# Patient Record
Sex: FEMALE | Race: WHITE | Employment: FULL TIME | URBAN - METROPOLITAN AREA
[De-identification: names, ages, dates, MRNs, and addresses within clinical notes are randomized per-mention and may not be internally consistent; named-entity substitution may affect disease eponyms.]

---

## 2017-01-18 DIAGNOSIS — Z83.49 FAMILY HISTORY OF THYROID DISEASE: ICD-10-CM

## 2017-01-18 DIAGNOSIS — Z13.6 CARDIOVASCULAR SCREENING; LDL GOAL LESS THAN 160: ICD-10-CM

## 2017-01-18 LAB
CHOLEST SERPL-MCNC: 162 MG/DL
HDLC SERPL-MCNC: 67 MG/DL
LDLC SERPL CALC-MCNC: 87 MG/DL
NONHDLC SERPL-MCNC: 95 MG/DL
TRIGL SERPL-MCNC: 41 MG/DL
TSH SERPL DL<=0.005 MIU/L-ACNC: 1.32 MU/L (ref 0.4–4)

## 2017-01-18 PROCEDURE — 36415 COLL VENOUS BLD VENIPUNCTURE: CPT | Performed by: NURSE PRACTITIONER

## 2017-01-18 PROCEDURE — 84443 ASSAY THYROID STIM HORMONE: CPT | Performed by: NURSE PRACTITIONER

## 2017-01-18 PROCEDURE — 80061 LIPID PANEL: CPT | Performed by: NURSE PRACTITIONER

## 2019-07-29 ENCOUNTER — OFFICE VISIT (OUTPATIENT)
Dept: FAMILY MEDICINE | Facility: CLINIC | Age: 44
End: 2019-07-29
Payer: COMMERCIAL

## 2019-07-29 VITALS
TEMPERATURE: 98.3 F | WEIGHT: 158 LBS | OXYGEN SATURATION: 100 % | DIASTOLIC BLOOD PRESSURE: 76 MMHG | HEIGHT: 67 IN | SYSTOLIC BLOOD PRESSURE: 125 MMHG | BODY MASS INDEX: 24.8 KG/M2 | HEART RATE: 91 BPM

## 2019-07-29 DIAGNOSIS — Z00.00 ROUTINE GENERAL MEDICAL EXAMINATION AT A HEALTH CARE FACILITY: Primary | ICD-10-CM

## 2019-07-29 DIAGNOSIS — R10.2 PELVIC PRESSURE IN FEMALE: ICD-10-CM

## 2019-07-29 DIAGNOSIS — Z12.31 VISIT FOR SCREENING MAMMOGRAM: ICD-10-CM

## 2019-07-29 DIAGNOSIS — Z12.4 SCREENING FOR MALIGNANT NEOPLASM OF CERVIX: ICD-10-CM

## 2019-07-29 PROCEDURE — 87624 HPV HI-RISK TYP POOLED RSLT: CPT | Performed by: PHYSICIAN ASSISTANT

## 2019-07-29 PROCEDURE — 99212 OFFICE O/P EST SF 10 MIN: CPT | Mod: 25 | Performed by: PHYSICIAN ASSISTANT

## 2019-07-29 PROCEDURE — 99396 PREV VISIT EST AGE 40-64: CPT | Performed by: PHYSICIAN ASSISTANT

## 2019-07-29 PROCEDURE — G0145 SCR C/V CYTO,THINLAYER,RESCR: HCPCS | Performed by: PHYSICIAN ASSISTANT

## 2019-07-29 ASSESSMENT — PAIN SCALES - GENERAL: PAINLEVEL: NO PAIN (0)

## 2019-07-29 ASSESSMENT — MIFFLIN-ST. JEOR: SCORE: 1396.69

## 2019-07-29 NOTE — PATIENT INSTRUCTIONS
Schedule mammogram    Contact [facility] [phone] to schedule appointment for pelvic ultrasound    Appointment in GYN to discuss pelvic relaxation treatment options        Preventive Health Recommendations  Female Ages 40 to 49    Yearly exam:     See your health care provider every year in order to  1. Review health changes.   2. Discuss preventive care.    3. Review your medicines if your doctor prescribed any.      Get a Pap test every three years (unless you have an abnormal result and your provider advises testing more often).      If you get Pap tests with HPV test, you only need to test every 5 years, unless you have an abnormal result. You do not need a Pap test if your uterus was removed (hysterectomy) and you have not had cancer.      You should be tested each year for STDs (sexually transmitted diseases), if you're at risk.     Ask your doctor if you should have a mammogram.      Have a colonoscopy (test for colon cancer) if someone in your family has had colon cancer or polyps before age 50.       Have a cholesterol test every 5 years.       Have a diabetes test (fasting glucose) after age 45. If you are at risk for diabetes, you should have this test every 3 years.    Shots: Get a flu shot each year. Get a tetanus shot every 10 years.     Nutrition:     Eat at least 5 servings of fruits and vegetables each day.    Eat whole-grain bread, whole-wheat pasta and brown rice instead of white grains and rice.    Get adequate Calcium and Vitamin D.      Lifestyle    Exercise at least 150 minutes a week (an average of 30 minutes a day, 5 days a week). This will help you control your weight and prevent disease.    Limit alcohol to one drink per day.    No smoking.     Wear sunscreen to prevent skin cancer.    See your dentist every six months for an exam and cleaning.

## 2019-07-29 NOTE — NURSING NOTE
"Chief Complaint   Patient presents with     Physical       Initial /76   Pulse 91   Temp 98.3  F (36.8  C) (Oral)   Ht 1.69 m (5' 6.52\")   Wt 71.7 kg (158 lb)   LMP 07/19/2019   SpO2 100%   BMI 25.10 kg/m   Estimated body mass index is 25.1 kg/m  as calculated from the following:    Height as of this encounter: 1.69 m (5' 6.52\").    Weight as of this encounter: 71.7 kg (158 lb).  Medication Reconciliation: complete    TALIB Toney MA    "

## 2019-07-29 NOTE — PROGRESS NOTES
SUBJECTIVE:   CC: Brittni Massey is an 44 year old woman who presents for preventive health visit.     Healthy Habits:    Do you get at least three servings of calcium containing foods daily (dairy, green leafy vegetables, etc.)? yes    Amount of exercise or daily activities, outside of work: 4 day(s) per week    Problems taking medications regularly not applicable    Medication side effects:     Have you had an eye exam in the past two years? no    Do you see a dentist twice per year? yes    Do you have sleep apnea, excessive snoring or daytime drowsiness?          Today's PHQ-2 Score:   PHQ-2 ( 1999 Pfizer) 7/29/2019 3/21/2016   Q1: Little interest or pleasure in doing things 0 0   Q2: Feeling down, depressed or hopeless 0 0   PHQ-2 Score 0 0       Abuse: Current or Past(Physical, Sexual or Emotional)- No  Do you feel safe in your environment? Yes    Social History     Tobacco Use     Smoking status: Never Smoker     Smokeless tobacco: Never Used     Tobacco comment: Nonsmoking household   Substance Use Topics     Alcohol use: Yes     Comment: Very very rarely     If you drink alcohol do you typically have >3 drinks per day or >7 drinks per week? No                     Reviewed orders with patient.  Reviewed health maintenance and updated orders accordingly - Yes  BP Readings from Last 3 Encounters:   07/29/19 125/76   03/21/16 115/72   03/16/15 121/81    Wt Readings from Last 3 Encounters:   07/29/19 71.7 kg (158 lb)   03/21/16 64 kg (141 lb)   03/16/15 63 kg (139 lb)                  Patient Active Problem List   Diagnosis     CARDIOVASCULAR SCREENING; LDL GOAL LESS THAN 160     Dysmenorrhea     Dysplasia of cervix, low grade (NO 1)     Past Surgical History:   Procedure Laterality Date     SURGICAL HISTORY OF -   1990    Shaved heel bone right foot.       Social History     Tobacco Use     Smoking status: Never Smoker     Smokeless tobacco: Never Used     Tobacco comment: Nonsmoking household   Substance  Use Topics     Alcohol use: Yes     Comment: Very very rarely     Family History   Problem Relation Age of Onset     Heart Disease Mother         MI before age 40. congenital defect.      Hypertension Father      Circulatory Maternal Grandfather         brain aneurysm     Cancer Paternal Grandfather         throat and mouth, smoker         No current outpatient medications on file.     Allergies   Allergen Reactions     Sulfa Drugs Rash       Mammogram Screening: Patient under age 50, mutual decision reflected in health maintenance.      Pertinent mammograms are reviewed under the imaging tab.  History of abnormal Pap smear:   YES - updated in Problem List and Health Maintenance accordingly  Last 3 Pap and HPV Results:   PAP / HPV Latest Ref Rng & Units 3/21/2016 2/24/2015 4/17/2012   PAP - NIL LSIL(A) NIL   HPV 16 DNA NEG Negative - -   HPV 18 DNA NEG Negative - -   OTHER HR HPV NEG Negative - -     PAP / HPV Latest Ref Rng & Units 3/21/2016 2/24/2015 4/17/2012   PAP - NIL LSIL(A) NIL   HPV 16 DNA NEG Negative - -   HPV 18 DNA NEG Negative - -   OTHER HR HPV NEG Negative - -     Reviewed and updated as needed this visit by clinical staff  Tobacco  Allergies  Meds  Med Hx  Surg Hx  Fam Hx  Soc Hx        Reviewed and updated as needed this visit by Provider        Past Medical History:   Diagnosis Date     H/O colposcopy with cervical biopsy 3/16/15    ECC - LSIL, Bx - NO 1     LSIL (low grade squamous intraepithelial lesion) on Pap smear 2/24/15    + HR HPV      Past Surgical History:   Procedure Laterality Date     SURGICAL HISTORY OF -   1990    Shaved heel bone right foot.       ROS:  CONSTITUTIONAL: NEGATIVE for fever, chills, change in weight  INTEGUMENTARU/SKIN: NEGATIVE for worrisome rashes, moles or lesions  EYES: NEGATIVE for vision changes or irritation  ENT: NEGATIVE for ear, mouth and throat problems  RESP: NEGATIVE for significant cough or SOB  BREAST: NEGATIVE for masses, tenderness or  "discharge  CV: NEGATIVE for chest pain, palpitations or peripheral edema  GI: NEGATIVE for nausea, abdominal pain, heartburn, or change in bowel habits   female: normal menses. She feels a constant pelvic pressure,some urinary incontinence with stress.   MUSCULOSKELETAL: NEGATIVE for significant arthralgias or myalgia  NEURO: NEGATIVE for weakness, dizziness or paresthesias  PSYCHIATRIC: NEGATIVE for changes in mood or affect    OBJECTIVE:   /76   Pulse 91   Temp 98.3  F (36.8  C) (Oral)   Ht 1.69 m (5' 6.52\")   Wt 71.7 kg (158 lb)   LMP 07/19/2019   SpO2 100%   BMI 25.10 kg/m    EXAM:  GENERAL: healthy, alert and no distress  EYES: Eyes grossly normal to inspection, PERRL and conjunctivae and sclerae normal  HENT: ear canals and TM's normal, nose and mouth without ulcers or lesions  NECK: no adenopathy, no asymmetry, masses, or scars and thyroid normal to palpation  RESP: lungs clear to auscultation - no rales, rhonchi or wheezes  BREAST: deferred per patient  CV: regular rate and rhythm, normal S1 S2, no S3 or S4, no murmur, click or rub, no peripheral edema and peripheral pulses strong  ABDOMEN: soft, nontender, no hepatosplenomegaly, no masses and bowel sounds normal   (female): normal female external genitalia, normal urethral meatus, vaginal mucosa pink, moist, well rugated, and normal cervix/adnexa/uterus without masses or discharge  MS: no gross musculoskeletal defects noted, no edema  SKIN: no suspicious lesions or rashes  NEURO: Normal strength and tone, mentation intact and speech normal  PSYCH: mentation appears normal, affect normal/bright    Diagnostic Test Results:  none     ASSESSMENT/PLAN:   1. Routine general medical examination at a health care facility  Health maintenance reviewed and updated.    2. Screening for malignant neoplasm of cervix  - Pap imaged thin layer screen with HPV - recommended age 30 - 65  - HPV High Risk Types DNA Cervical    3. Visit for screening " "mammogram  - MA SCREENING DIGITAL BILAT - Future  (s+30); Future    4. Pelvic pressure in female  Plan for pelvic ultrasound. Reassurance that her exam is normal.   Referral placed for GYN consultation.   - US Pelvic Complete w Transvaginal; Future  - OB/GYN REFERRAL    COUNSELING:   Reviewed preventive health counseling, as reflected in patient instructions       Regular exercise       Healthy diet/nutrition       Contraception       Colon cancer screening       (Liya)menopause management    Estimated body mass index is 25.1 kg/m  as calculated from the following:    Height as of this encounter: 1.69 m (5' 6.52\").    Weight as of this encounter: 71.7 kg (158 lb).    Weight management plan: Discussed healthy diet and exercise guidelines     reports that she has never smoked. She has never used smokeless tobacco.      Counseling Resources:  ATP IV Guidelines  Pooled Cohorts Equation Calculator  Breast Cancer Risk Calculator  FRAX Risk Assessment  ICSI Preventive Guidelines  Dietary Guidelines for Americans, 2010  USDA's MyPlate  ASA Prophylaxis  Lung CA Screening    Kristen M. Kehr, PA-C  [facility]  "

## 2019-08-01 LAB
COPATH REPORT: NORMAL
PAP: NORMAL

## 2019-08-05 LAB
FINAL DIAGNOSIS: NORMAL
HPV HR 12 DNA CVX QL NAA+PROBE: NEGATIVE
HPV16 DNA SPEC QL NAA+PROBE: NEGATIVE
HPV18 DNA SPEC QL NAA+PROBE: NEGATIVE
SPECIMEN DESCRIPTION: NORMAL
SPECIMEN SOURCE CVX/VAG CYTO: NORMAL

## 2020-02-23 ENCOUNTER — HEALTH MAINTENANCE LETTER (OUTPATIENT)
Age: 45
End: 2020-02-23

## 2020-05-16 ENCOUNTER — VIRTUAL VISIT (OUTPATIENT)
Dept: FAMILY MEDICINE | Facility: OTHER | Age: 45
End: 2020-05-16

## 2020-05-16 NOTE — PROGRESS NOTES
"Date: 2020 10:56:39  Clinician: Edward Schultz  Clinician NPI: [id number]  Patient: Brittni Massey  Patient : 1975  Patient Address: [address] [place], [place] [id number]  Patient Phone: [phone]  Visit Protocol: URI  Patient Summary:  Brittni is a 45 year old ( : 1975 ) female who initiated a Visit for COVID-19 (Coronavirus) evaluation and screening. When asked the question \"Please sign me up to receive news, health information and promotions. \", Brittni responded \"No\".    Brittni states her symptoms started 1-2 days ago.   Her symptoms consist of facial pain or pressure, a cough, nasal congestion, rhinitis, a headache, malaise, and myalgia.   Symptom details     Nasal secretions: The color of her mucus is clear.    Cough: Brittni coughs a few times an hour and her cough is not more bothersome at night. Phlegm does not come into her throat when she coughs. She does not believe her cough is caused by post-nasal drip.     Facial pain or pressure: The facial pain or pressure does not feel worse when bending or leaning forward.     Headache: She states the headache is mild (1-3 on a 10 point pain scale).      Brittni denies having nausea, teeth pain, ageusia, diarrhea, chills, sore throat, wheezing, enlarged lymph nodes, fever, vomiting, ear pain, and anosmia. She also denies having recent facial or sinus surgery in the past 60 days and taking antibiotic medication for the symptoms. She is not experiencing dyspnea.   Precipitating events  She has not recently been exposed to someone with influenza. Brittni has been in close contact with the following high risk individuals: people with asthma, heart disease or diabetes and adults 65 or older.   Pertinent COVID-19 (Coronavirus) information  In the past 14 days, Brittni has worked in a congregate living setting.   She either works or volunteers as a healthcare worker or a , or works or volunteers in a healthcare facility. She " provides direct patient care. 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 She does not live with a healthcare worker.   Brittni has had a close contact with a laboratory-confirmed COVID-19 patient within 14 days of symptom onset. She was exposed at her work. Additional information about contact with COVID-19 (Coronavirus) patient as reported by the patient (free text): Direct contact with coworker and patient   Pertinent medical history  Brittni does not get yeast infections when she takes antibiotics.   Brittni needs a return to work/school note.   Weight: 155 lbs   Brittni does not smoke or use smokeless tobacco.   She denies pregnancy and denies breastfeeding. She has menstruated in the past month.   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 This illness can cause fever, cough and trouble breathing. Many people get a mild case and get better on their own. Some people can get very sick.  What should I do?  We would like to test you for this virus. This will be a curbside test done outside the clinic.  Please call [phone] to schedule your visit. Explain that you were referred by [facility] to have a COVID-19 test. Be ready to share your [facility] visit ID number.  Starting now:  Stay at least 6 feet away from others. (If someone will drive you to your test, stay in the backseat, as far away from the  as you can.)   Don't go to work, school or anywhere else. When it's time for your test, go straight to the testing site. Don't make any stops on the way there or back.   Wash your hands  "and face often. Use soap and water.   Cover your mouth and nose with a mask, tissue or washcloth.   Don't touch anyone. No hugging, kissing or handshakes.  While at home   Stay home and away from others (self-isolate) until:  You've had no fever---and no medicine that reduces fever---for 3 full days (72 hours). And...  Your other symptoms have gotten better. For example, your cough or breathing has improved. And...  At least 10 days have passed since your symptoms started.  During this time:  Stay in your own room (and use your own bathroom), if you can.  Don't go to work, school or anywhere else.  Stay away from others in your home. No hugging, kissing or shaking hands.  Don't let anyone visit.  Cover your mouth and nose with a mask, tissue or washcloth to avoid spreading germs.  Clean \"high touch\" surfaces often (doorknobs, counters, handles, etc.). Use a household cleaning spray or wipes.  Wash your hands and face often. Use soap and water.  How can I take care of myself?  1. Get lots of rest. Drink extra fluids (unless your doctor has told you not to).  2. Take Tylenol (acetaminophen) for fever or pain. If you have liver or kidney problems, ask your family doctor if it's okay to take Tylenol.  Adults can take either:   650 mg (two 325 mg pills) every 4 to 6 hours, or...  1,000 mg (two 500 mg pills) every 8 hours as needed.   Note: Don't take more than 3,000 mg in one day.   Acetaminophen is found in many medicines (both prescribed and over-the-counter medicines). Read all labels to be sure you don't take too much.   For children, check the Tylenol bottle for the right dose. The dose is based on the child's age or weight.  3. If you have other health problems (like cancer, heart failure, an organ transplant or severe kidney disease): Call your specialty clinic if you don't feel better in the next 2 days.  4. Know when to call 911: If your breathing is so bad that it keeps you from doing normal activities, call " 911 or go to the emergency room. Tell them that you've been staying home and may have COVID-19.  5. Sign up for [employer]. We know it's scary to hear that you might have COVID-19. We want to track your symptoms to make sure you're okay over the next 2 weeks. Please look for an email from [employer]---this is a free, online program that we'll use to keep in touch. To sign up, follow the link in the email. Learn more at [email or web address].  6. The following will serve as your written order for this Covid Test ordered by me for the indication of suspected Covid [Z20.828]: The test will be ordered in [employer], our electronic health record after you are scheduled and will show as ordered and authorized by Nicholas Soria MD   Order: Covid-19 (Coronavirus) PCR for SYMPTOMATIC testing from [facility]  Where can I get more information?  To learn more about COVID-19 and how to care for yourself at home, please visit the CDC website at https://www.cdc.gov/coronavirus/2019-ncov/about/steps-when-sick.html.  For more about your care at [facility], please visit [email or web address]  If you'd like to be part of a COVID-19 clinical trial (research study) at the [facility], go to [email or web address] for details.    Diagnosis: Cough  Diagnosis ICD: R05

## 2020-05-17 ENCOUNTER — OFFICE VISIT (OUTPATIENT)
Dept: URGENT CARE | Facility: URGENT CARE | Age: 45
End: 2020-05-17
Payer: COMMERCIAL

## 2020-05-17 DIAGNOSIS — Z20.822 SUSPECTED COVID-19 VIRUS INFECTION: Primary | ICD-10-CM

## 2020-05-17 PROCEDURE — 87635 SARS-COV-2 COVID-19 AMP PRB: CPT | Mod: 90 | Performed by: FAMILY MEDICINE

## 2020-05-17 PROCEDURE — 99207 ZZC NO BILLABLE SERVICE THIS VISIT: CPT

## 2020-05-17 PROCEDURE — 99000 SPECIMEN HANDLING OFFICE-LAB: CPT | Performed by: FAMILY MEDICINE

## 2020-05-18 ENCOUNTER — TELEPHONE (OUTPATIENT)
Dept: FAMILY MEDICINE | Facility: CLINIC | Age: 45
End: 2020-05-18

## 2020-05-18 LAB
SARS-COV-2 RNA SPEC QL NAA+PROBE: ABNORMAL
SPECIMEN SOURCE: ABNORMAL

## 2020-05-18 NOTE — TELEPHONE ENCOUNTER
"Coronavirus (COVID-19) Notification    Patient  Brittni Massey    Reason for call  Notify of Positive Coronavirus (COVID-19) lab results, assess symptoms,  review [facility] recommendations    Lab Result    Lab test:  2019-nCoV rRt-PCR or SARS-CoV-2 PCR    Oropharyngeal AND/OR nasopharyngeal swabs is POSITIVE for 2019-nCoV RNA/SARS-COV-2 PCR (COVID-19 virus)    RN Recommendations/Instructions per [facility] Coronavirus COVID-19 recommendations    Brief introduction script  Hi, My name is NOEMI Sharma and I am calling on behalf of [employer].  We were notified that your Coronavirus test (COVID-19) for was POSITIVE for the virus.  I have some information to relay to you but first I wanted to mention that the [place] Dept of Health will be contacting you shortly [it's possible MD already called Patient] to talk to you more about how you are feeling and other people you have had contact with who might now also have the virus.  Also, [facility] is Partnering with the [facility] for Covid-19 research, you may be contacted directly by research staff.    Assessment (Inquire about Patient's current symptoms)  Pt reports cough and nasal congestion, onset of symptom 5/14/20.  Pt reports \"stuffy nose\" and cough  as predominant symptoms although she does feel run down and \"head-achy\".  Pt reports feeling the same today as when symptoms began.  Denies Fever, and shortness of breath.     If at time of call, Patients symptoms hare worsened, the Patient should contact 911 or have someone drive them to Emergency Dept promptly:      If Patient calling 911, inform 911 personal that you have tested positive for the Coronavirus (COVID-19).  Place mask on and await 911 to arrive.    If Emergency Dept, If possible, please have another adult drive you to the Emergency Dept but you need to wear mask when in contact with other people.      Review information with Patient    Since you tested POSITIVE for the " COVID-19 virus, it is important that you protect others from being exposed and infected with this virus.    [For safety, it's very important to follow these rules.]    First, stay home and away from others (self-isolate) until:    You've had no fever--and no medicine that reduces fever--for 3 full days (72 hours). And      Your other symptoms have gotten better. For example, your cough or breathing has improved. And     At least 10 days have passed since your symptoms started.    During this time:    Stay in your own room (and use your own bathroom), if you can.    Stay away from others in your home. No hugging, kissing or shaking hands.    Don't let anyone visit.    Don't go to work, school or anywhere else.     Clean  high touch  surfaces often (doorknobs, counters, handles, etc.). Use a household cleaning spray or wipes.    Cover your mouth and nose with a mask, tissue or washcloth to avoid spreading germs.    Wash your hands and face often with soap and water.    You should not go back to work until you meet the guidelines above for ending your home isolation. You should meet these along with any other guidelines that your employer has.  Employers: This document serves as formal notice of your employee's medical guidelines for going back to work. They must meet the above guidelines before going back to work in person.      How can I take care of myself?  1. Get lots of rest. Drink extra fluids (unless a doctor has told you not to).    2. Take Tylenol (acetaminophen) for fever or pain. If you have liver or kidney problems, ask your family doctor if it's okay to take Tylenol.     Take either:     650 mg (two 325 mg pills) every 4 to 6 hours, or     1,000 mg (two 500 mg pills) every 8 hours as needed.     Note: Don't take more than 3,000 mg in one day. Acetaminophen is found in many medicines (both prescribed and over-the-counter medicines). Read all labels to be sure you don't take too much.  For children, check  the Tylenol bottle for the right dose. The dose is based on the child's age or weight.  3. If you have other health problems (like cancer, heart failure, an organ transplant or severe kidney disease): Call your specialty clinic if you don't feel better in the next 2 days.    4. Know when to call 911: If your breathing is so bad that it keeps you from doing normal activities, call 911 or go to the emergency room. Tell them that you've been staying home and may have COVID-19.    5. Sign up for [employer]. We know it's scary to hear that you have COVID-19. We want to track your symptoms to make sure you're okay over the next 2 weeks. Please look for an email from [employer]--this is a free, online program that we'll use to keep in touch. To sign up, follow the link in the email. Learn more at [email or web address].      Where can I get more information?    To learn the [place]'s guidelines for staying home, please visit the [facility] website at [email or web address].    To learn more about COVID-19 and how to care for yourself at home, please visit the CDC website at https://www.cdc.gov/coronavirus/2019-ncov/about/steps-when-sick.html.    For more options for care at [facility], please visit our website at [email or web address]      [place] Dept of Health ([facility]) COVID-19 Hotline:   [phone]    Positive COVID-19 letter sent (Yes/No):  yes  Pt verbalized understanding of information given above and all questions answered.    [Name]  Edith Neves RN

## 2020-10-11 ENCOUNTER — VIRTUAL VISIT (OUTPATIENT)
Dept: FAMILY MEDICINE | Facility: OTHER | Age: 45
End: 2020-10-11

## 2020-10-12 NOTE — PROGRESS NOTES
"Date: 10/11/2020 20:03:21  Clinician: Edward Schultz  Clinician NPI: [id number]  Patient: Brittni Massey  Patient : 1975  Patient Address: [address] [place] [id number]  Patient Phone: [phone]  Visit Protocol: Eye conditions  Patient Summary:  Brittni is a 45 year old (: 1975 ) female who initiated a [facility] Visit for conjunctivitis.  When asked the question \"Please sign me up to receive news, health information and promotions. \", Brittni responded \"No\".    Images of her eye condition were uploaded.   Her symptoms started 3-6 days ago and affect the left eye. The symptoms consist of eyelid swelling, bump(s) on the eyelid, drainage coming from the eye(s), eye redness, and itchy eye(s).   Symptom details     Drainage: The color of the drainage coming out of her eye(s) is yellow. The drainage is watery and causes her eyelids to be stuck shut in the morning.    Itchiness: Brittni does not have seasonal allergies or hay fever.    Eyelid bump(s): Brittni has 1 bump on her eyelid. The bump(s) on her eyelid is not tender.     Denied symptoms include light sensitivity and eye pain. Brittni does not have subconjunctival hemorrhage and has not experienced a decrease in vision. She does not feel feverish.   Precipitating events   Brittni has not had a recent diagnosis of conjunctivitis. She also has not had a recent eye injury, foreign body in the eye(s), cold or ear infection, and eye surgery. It is not known if Brittni has recently been exposed to someone with a red eye or an eye infection. She does not wear contact lenses.   Pertinent medical history  Brittni has not ever been diagnosed with glaucoma.   Brittni has been using artificial tears to treat her current symptoms.   Medication efficacy as reported by the patient (free text): Artificial tears help the burning for a while   Brittni does not require proof of evaluation of her eye condition before returning to school, work, or .   Brittni " does not smoke or use smokeless tobacco.   She denies pregnancy and denies breastfeeding. She has menstruated in the past month.     MEDICATIONS: Tylenol oral, ALLERGIES: Sulfa (Sulfonamide Antibiotics)  Clinician Response:  Dear Brittni,  Based on the information provided, you most likely have bacterial conjunctivitis, more commonly called pink eye.  Medication information  I am prescribing:  Ofloxacin (Ocuflox) 0.3% ophthalmic (eye) drops. Apply 1-2 drops into the affected eye(s) 4 times per day for 5-7 days. There are no refills with this prescription.  The medication I prescribed is an antibiotic medication. Infections can be caused by either bacteria or a virus, and often have similar symptoms, so it is possible that this is a viral infection. Antibiotics are only effective against bacterial infections, so when it is caused by a virus, the medication will not help symptoms improve or make it less contagious.  Self care  To reduce the spread of the eye infection, you should not use eye makeup until the infection has fully resolved, and be sure to wash your hands at least once per hour and avoid touching the eyes as much as possible.  The following will reduce the risk for future eye infections:     Frequent handwashing    Replace towels and washcloths daily    Do not share towels and washcloths with others    Replace eye makeup used while eyes were infected    Do not use anyone else's eye makeup     Steps you can take to be as comfortable as possible:     Avoid rubbing your eyes    Apply a cool compress to the eye(s)    Take regular breaks and remember to blink regularly when reading or using a computer for long periods of time    Wear sunglasses when outside    Wear eye protection when swimming or working with chemicals    Use good lighting     When to seek care  Please make an appointment to be seen in a clinic or urgent care if any of the following occurs:     You develop new symptoms or your symptoms becomes  worse.    Your symptoms do not improve within 2 days of starting treatment.      Diagnosis: Bacterial conjunctivitis  Diagnosis ICD: H10.9  Prescription: ofloxacin (Ocuflox) 0.3 % ophthalmic (eye) drops 1 5 ml dropper bottle, 7 days supply. Apply 1-2 drops into the affected eye(s) 4 times per day for 5-7 days. Refills: 0, Refill as needed: no, Allow substitutions: yes  Pharmacy: [id number] - [phone] - [address] [place] [address]

## 2020-12-06 ENCOUNTER — HEALTH MAINTENANCE LETTER (OUTPATIENT)
Age: 45
End: 2020-12-06

## 2021-01-13 ENCOUNTER — OFFICE VISIT (OUTPATIENT)
Dept: FAMILY MEDICINE | Facility: CLINIC | Age: 46
End: 2021-01-13
Payer: COMMERCIAL

## 2021-01-13 VITALS
TEMPERATURE: 98.2 F | WEIGHT: 164 LBS | SYSTOLIC BLOOD PRESSURE: 133 MMHG | HEART RATE: 99 BPM | HEIGHT: 67 IN | OXYGEN SATURATION: 99 % | DIASTOLIC BLOOD PRESSURE: 83 MMHG | BODY MASS INDEX: 25.74 KG/M2

## 2021-01-13 DIAGNOSIS — M25.561 RIGHT KNEE PAIN, UNSPECIFIED CHRONICITY: Primary | ICD-10-CM

## 2021-01-13 PROCEDURE — 99213 OFFICE O/P EST LOW 20 MIN: CPT | Performed by: PHYSICIAN ASSISTANT

## 2021-01-13 ASSESSMENT — MIFFLIN-ST. JEOR: SCORE: 1418.59

## 2021-01-13 NOTE — PATIENT INSTRUCTIONS
Contact [facility] [phone] to schedule appointment for the MRI of your knee.     Patient Education     Knee Pain with Uncertain Cause    There are several common causes for knee pain. These can include:    A sprain of the ligaments that support the joint    An injury to the cartilage lining of the joint    Arthritis from wear-and-tear or inflammation  There are other causes as well. There may also be swelling, reduced movement of the knee joint, and pain with walking. A definite diagnosis will still need to be made. If your symptoms don't improve, further follow-up and testing may be needed.  Home care    Stay off the injured leg as much as possible until pain improves.    Apply an ice pack over the injured area for 15 to 20 minutes every 3 to 6 hours. You should do this for the first 24 to 48 hours. You can make an ice pack by filling a plastic bag that seals at the top with ice cubes and then wrapping it with a thin towel. Continue to use ice packs for relief of pain and swelling as needed. As the ice melts, be careful not to get your wrap, splint, or cast wet. After 48 hours, apply heat (warm shower or warm bath) for 15 to 20 minutes several times a day, or alternate ice and heat. If you have to wear a hook-and-loop knee brace, you can open it to apply the ice pack, or heat, directly to the knee. Never put ice directly on the skin. Always wrap the ice in a towel or other type of cloth.    You may use over-the-counter pain medicine to control pain, unless another pain medicine was prescribed. If you have chronic liver or kidney disease or ever had a stomach ulcer or gastrointestinal bleeding, talk with your healthcare provider before using these medicines.    If crutches or a walker have been recommended, don't put weight on the injured leg until you can do so without pain. Check with your healthcare provider before returning to sports or full work duties.    If you have a hook-and-loop knee brace, you  can remove it to bathe and sleep, unless told otherwise.  Follow-up care  Follow up with your healthcare provider as advised. This is usually within 1 to 2 weeks.  If X-rays were taken, you will be told of any new findings that may affect your care  Call 911  Call 911 if you have:    Shortness of breath    Chest pain  When to seek medical advice  Call your healthcare provider right away if any of these occur:    Toes or foot becomes swollen, cold, blue, numb, or tingly    Pain or swelling spreads over the knee or calf    Warmth or redness appears over the knee or calf    Other joints become painful    Rash appears    Fever of 100.4 F (38 C) or higher, or as directed by your healthcare provider    Zenaida Rachel last reviewed this educational content on 5/1/2018 2000-2020 [employer]. [address] [place] [address], [place] [address]. All rights reserved. This information is not intended as a substitute for professional medical care. Always follow your healthcare professional's instructions.

## 2021-01-13 NOTE — PROGRESS NOTES
"  Assessment & Plan     Right knee pain, unspecified chronicity  Brace given.   Pain for 2 months with no relief with conservative treatments, she has been using ice / heat / rest / NSAIDS.   Plan MRI.   I will contact with results and how to go forward with treatment.   - MR Knee Right w/o Contrast; Future  - Miscellaneous Order for DME - ONLY FOR DME      No follow-ups on file.    Kristen M. Kehr, PA-C [facility]     Brittni is a 45 year old who presents to clinic today for the following health issues     HPI       Musculoskeletal problem/pain  Onset/Duration: 2months. No known injury.   The pain is in the medial aspect of the knee. There is swelling regularly. She has pain when she is standing for her work shifts and will need to go home and elevate and ice. She feels unsteady on her knee at times, mainly with going up and down stairs. She started an exercise regimen at home and the pain started shortly after that, but otherwise no previous injury to her knee.   Description  Location: knee - right  Joint Swelling: YES  Redness: has been  Pain: YES  Warmth: has been  Intensity:  severe  Progression of Symptoms:  same  Accompanying signs and symptoms:   Fevers: no  Numbness/tingling/weakness: YES- weakness rt knee  History  Trauma to the area: not applicable  Recent illness:  no  Previous similar problem: no  Previous evaluation:  no  Precipitating or alleviating factors:  Aggravating factors include: sitting and ibu, ice  Therapies tried and outcome: rest/inactivity and Ibuprofen        Review of Systems   Constitutional, HEENT, cardiovascular, pulmonary, GI, , musculoskeletal, neuro, skin, endocrine and psych systems are negative, except as otherwise noted.      Objective    /83   Pulse 99   Temp 98.2  F (36.8  C) (Tympanic)   Ht 1.689 m (5' 6.5\")   Wt 74.4 kg (164 lb)   LMP 12/30/2020   SpO2 99%   BMI 26.07 kg/m    Body mass index is 26.07 kg/m .  Physical Exam "   GENERAL: healthy, alert and no distress  MS: Right knee: mild swelling present, palpable fluid and tenderness along the medial anterior aspect of the knee. Pain with flexion and extension. She also has pain with meniscal testing.   SKIN: no suspicious lesions or rashes  PSYCH: mentation appears normal, affect normal/bright

## 2021-01-19 ENCOUNTER — ANCILLARY PROCEDURE (OUTPATIENT)
Dept: MRI IMAGING | Facility: CLINIC | Age: 46
End: 2021-01-19
Attending: PHYSICIAN ASSISTANT
Payer: COMMERCIAL

## 2021-01-19 DIAGNOSIS — M25.561 RIGHT KNEE PAIN, UNSPECIFIED CHRONICITY: ICD-10-CM

## 2021-01-19 PROCEDURE — 73721 MRI JNT OF LWR EXTRE W/O DYE: CPT | Mod: RT | Performed by: RADIOLOGY

## 2021-01-20 DIAGNOSIS — M25.561 RIGHT KNEE PAIN, UNSPECIFIED CHRONICITY: Primary | ICD-10-CM

## 2021-01-20 DIAGNOSIS — R93.6 ABNORMAL MRI, KNEE: ICD-10-CM

## 2021-01-25 ENCOUNTER — OFFICE VISIT (OUTPATIENT)
Dept: ORTHOPEDICS | Facility: CLINIC | Age: 46
End: 2021-01-25
Attending: PHYSICIAN ASSISTANT
Payer: COMMERCIAL

## 2021-01-25 ENCOUNTER — ANCILLARY PROCEDURE (OUTPATIENT)
Dept: GENERAL RADIOLOGY | Facility: CLINIC | Age: 46
End: 2021-01-25
Attending: ORTHOPAEDIC SURGERY
Payer: COMMERCIAL

## 2021-01-25 VITALS
HEIGHT: 67 IN | DIASTOLIC BLOOD PRESSURE: 79 MMHG | SYSTOLIC BLOOD PRESSURE: 118 MMHG | WEIGHT: 165.4 LBS | BODY MASS INDEX: 25.96 KG/M2 | HEART RATE: 105 BPM

## 2021-01-25 DIAGNOSIS — M94.261 CHONDROMALACIA OF RIGHT KNEE: ICD-10-CM

## 2021-01-25 DIAGNOSIS — M25.561 RIGHT KNEE PAIN, UNSPECIFIED CHRONICITY: ICD-10-CM

## 2021-01-25 DIAGNOSIS — M25.561 ACUTE PAIN OF RIGHT KNEE: Primary | ICD-10-CM

## 2021-01-25 PROCEDURE — 73562 X-RAY EXAM OF KNEE 3: CPT | Mod: RT | Performed by: RADIOLOGY

## 2021-01-25 PROCEDURE — 20610 DRAIN/INJ JOINT/BURSA W/O US: CPT | Mod: RT | Performed by: ORTHOPAEDIC SURGERY

## 2021-01-25 PROCEDURE — 99203 OFFICE O/P NEW LOW 30 MIN: CPT | Mod: 25 | Performed by: ORTHOPAEDIC SURGERY

## 2021-01-25 RX ORDER — BUPIVACAINE HYDROCHLORIDE 2.5 MG/ML
4 INJECTION, SOLUTION INFILTRATION; PERINEURAL
Status: DISCONTINUED | OUTPATIENT
Start: 2021-01-25 | End: 2021-06-03

## 2021-01-25 RX ORDER — METHYLPREDNISOLONE ACETATE 80 MG/ML
80 INJECTION, SUSPENSION INTRA-ARTICULAR; INTRALESIONAL; INTRAMUSCULAR; SOFT TISSUE
Status: DISCONTINUED | OUTPATIENT
Start: 2021-01-25 | End: 2021-06-03

## 2021-01-25 RX ADMIN — METHYLPREDNISOLONE ACETATE 80 MG: 80 INJECTION, SUSPENSION INTRA-ARTICULAR; INTRALESIONAL; INTRAMUSCULAR; SOFT TISSUE at 10:32

## 2021-01-25 RX ADMIN — BUPIVACAINE HYDROCHLORIDE 4 ML: 2.5 INJECTION, SOLUTION INFILTRATION; PERINEURAL at 10:32

## 2021-01-25 ASSESSMENT — PAIN SCALES - GENERAL: PAINLEVEL: MILD PAIN (3)

## 2021-01-25 ASSESSMENT — MIFFLIN-ST. JEOR: SCORE: 1424.94

## 2021-01-25 NOTE — PROGRESS NOTES
"CHIEF COMPLAINT:   Chief Complaint   Patient presents with     Right Knee - Pain     Onset: 11/20. NKI. Pain came on suddenly. Pain is more medial and posterior. No tx. No prior knee pain. Pain increases with activity/walking.     Brittni Massey is seen today in the [facility] for evaluation of right knee pain at the request of Kristen M Kehr, PA-C            HISTORY OF PRESENT ILLNESS    Brittni Massey is a 45 year old female seen for evaluation of ongoing right knee pain with no known injury.   Pain has been present for over 2 months, ~11/2020. Onset of pain after starting an exercise regimen at home, just came on, has been ever since. Locates pain along the inner aspect of the knee and behind the knee. Pain is aggravated with activity, walking. Has pain at rest, worse at night. Has noticed some swelling but that is improved. Feels the knee \"locks\" all the time.    Denies prior knee problems.    On her feet 8hours/day as a nurse.    Present symptoms: pain medially  and posteriorly, pain sharp, shooting, dull/achy , mild pain, mild swelling, +locking.    Pain severity: 3/10  Frequency of symptoms: are constant  Exacerbating Factors: weight bearing, stairs, prolonged standing  Relieving Factors: rest, sitting  Night Pain: Yes  Pain while at rest: Yes   Numbness or tingling: No   Patient has tried:     NSAIDS: Yes      Physical Therapy: No      Activity modification: Yes      Bracing: No      Injections: No      Ice: Yes      Assistive device:  No     Other: heat      Other PMH:  has a past medical history of H/O colposcopy with cervical biopsy (3/16/15) and LSIL (low grade squamous intraepithelial lesion) on Pap smear (2/24/15).  Patient Active Problem List   Diagnosis     CARDIOVASCULAR SCREENING; LDL GOAL LESS THAN 160     Dysmenorrhea       Surgical Hx:  has a past surgical history that includes surgical history of -  (1990).    Medications: No current outpatient medications " "on file.    Allergies:   Allergies   Allergen Reactions     Sulfa Drugs Rash       Social Hx: Nurse.   reports that she has never smoked. She has never used smokeless tobacco. She reports current alcohol use. She reports that she does not use drugs.    Family Hx: family history includes Cancer in her paternal grandfather; Circulatory in her maternal grandfather; Heart Disease in her mother; Hypertension in her father.    REVIEW OF SYSTEMS: 10 point ROS neg other than the symptoms noted above in the HPI and PMH. Notables include  CONSTITUTIONAL:NEGATIVE for fever, chills, change in weight  INTEGUMENTARY/SKIN: NEGATIVE for worrisome rashes, moles or lesions  MUSCULOSKELETAL:See HPI above  NEURO: NEGATIVE for weakness, dizziness or paresthesias    PHYSICAL EXAM:  /79   Pulse 105   Ht 1.689 m (5' 6.5\")   Wt 75 kg (165 lb 6.4 oz)   LMP 12/30/2020   BMI 26.30 kg/m     GENERAL APPEARANCE: healthy, alert, no distress  SKIN: no suspicious lesions or rashes  NEURO: Normal strength and tone, mentation intact and speech normal  PSYCH:  mentation appears normal and affect normal, not anxious  RESPIRATORY: No increased work of breathing.  HANDS: no clubbing, nail pitting  LYMPH: no palpable popliteal lymphadenopathy.    BILATERAL LOWER EXTREMITIES:  Gait: slight favors the right   Alignment: varus  No gross deformities or masses.  No Quad atrophy, strength normal.  Intact sensation deep peroneal nerve, superficial peroneal nerve, med/lat tibial nerve, sural nerve, saphenous nerve  Intact EHL, EDL, TA, FHL, GS, quadriceps hamstrings and hip flexors  Toes warm and well perfused, brisk capillary refill. Palpable 2+ dp pulses.  Bilateral calf soft and nttp or squeeze.  DTRs: achilles 2+, patella 2+.  Edema: none    RIGHT KNEE EXAM:    Skin: intact, no ecchymosis or erythema  ROM: full extension to 130 flexion, posterior discomfort with extension and medial and anterior discomfort with flexion  Tight hamstrings on straight " leg raise.  Effusion: trace  Tender: medial joint line, anteromedial knee with possible palpable plica, lesser extent postero lateral joint line  McMurrays: positive medially.    MCL: stable, and non-painful at both 0 and 30 degrees knee flexion  Varus stress: stable, and non-painful at both 0 and 30 degrees knee flexion  Lachmans: neg, firm endpoint  Posterior Drawer stable  Patellofemoral joint:                Apprehension: negative              Crepitations: minimal   Grind: positive.    LEFT KNEE EXAM:    Skin: intact, no ecchymosis or erythema  ROM: full extension to 1354 flexion  Tight hamstrings on straight leg raise.  Effusion: none  Tender: NTTP med/lat joint line, anterior or posterior knee  McMurrays: negative    MCL: stable, and non-painful at both 0 and 30 degrees knee flexion  Varus stress: stable, and non-painful at both 0 and 30 degrees knee flexion  Lachmans: neg, firm endpoint  Posterior Drawer stable  Patellofemoral joint:                Apprehension: negative              Crepitations: minimal    X-RAY:  3 views right knee from 1/25/2021 were reviewed in clinic today. On my review, no obvious fractures or dislocations. Mild medial compartment narrowing.    MRI:  MRI right knee from 1/19/2021 was reviewed in clinic today.     1. Increased intrasubstance signal of the posterior horn of the  lateral meniscus consistent with meniscal degeneration, additional  horizontal oblique component with a subtle communication with the  tibial articular cartilage that could represent a subtle meniscal  tear. No displaced fragments off flaps  2. Free edge fraying of the posterior horn of the medial meniscus with  associated mild extrusion.  3. 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 Modified Outerbridge grade II chondromalacia of the  lateral compartment. Modified Outerbridge grade II chondromalacia of  the patellofemoral joint.  4. Moderate joint effusion.       ASSESSMENT/PLAN:  Brittni Massey is a 45 year old female with acute right knee pain, chonromalacia, possible medial meniscus and lateral meniscus tears.     * discussed injury with patient, what appears to be possible medial and lateral  meniscal tear on MRI, as well as some underlying arthritic changes, which is consistent with symptoms and physical examination findings.     * Discussed treatment options including nonoperative treatment with continued rest, ice, elevation, activity modification, NSAIDS and Physical Therapy, bracing and potential injections versus surgical treatment with arthroscopy and meniscal repair versus debridement, possible chondral debridement. Risks and benefits of each discussed in detail.  * in the setting of underlying chondrosis, predictability of arthroscopy is uncertain, unless mechanical symptoms present due to the meniscus tear.    * surgical risks discussed: bleeding, infection, pain, scar, damage to adjacent structures (nerve, vessels, cartilage), stiffness, post-traumatic arthritis, failure to relieve symptoms, recurrence of symptoms, blood clots (DVT), pulmonary emolism, risks of anesthesia and death. This surgery is not intended nor expected to alleviate arthritic pain symptoms, nor will it treat or correct underlying arthritic changes. Arthritis and symptoms related to arthritis could worsen with arthroscopy and meniscal and/or chondral debridement. Patient understands.    * understanding the risks of surgery, patient elected to proceed with injection, Physical Therapy today.  * return to clinic as needed.    * all questions addressed and answered prior to discharge from clinic today.  * patient to call if any questions or concerns in the meantime.        Ulisses Hyatt M.D., M.S.  Dept. of Orthopaedic Surgery  [facility]    Large Joint Injection/Arthocentesis: R knee joint    Date/Time: 1/25/2021 10:32 AM  Performed by: Phu Ballard PA  Authorized by: Ulisses Hyatt  MD Chadd     Indications:  Pain and osteoarthritis  Needle Size:  22 G  Guidance: landmark guided    Approach:  Anteromedial  Location:  Knee      Medications:  80 mg methylPREDNISolone 80 MG/ML; 4 mL bupivacaine 0.25 %  Outcome:  Tolerated well, no immediate complications  Procedure discussed: discussed risks, benefits, and alternatives    Consent Given by:  Patient  Prep: patient was prepped and draped in usual sterile fashion

## 2021-01-31 NOTE — PROGRESS NOTES
"[facility] Initial Evaluation  Subjective:  The history is provided by the patient. No  was used.   Therapist Generated HPI Evaluation  Problem details: Pt reports pain since about 11/2020 without specific incident.  Injection 01/25/2021 and referred to PT.  She reports the swelling had started to go down prior to the injection but feels the pain is \"a lot less\" and \"bearable\" since then.  She also reports that her R foot has a sensation of \"numbess\" since the injection that really only appears with walking  .         Type of problem:  Right knee.    This is a new condition.      Patient reports pain:  Medial and posterior.  Pain is described as sharp   Pain is worse in the P.M..    Exacerbated by: walking, squat.  Relieved by: laying on back, holding the knee with a partial bend.  Imaging testing: see imaging in chart.    Restrictions due to condition include:  Working in normal job without restrictions.  Barriers include:  Stairs.    Patient Health History  Brittni Massey being seen for right knee.       Problem occurred: unknown   Pain is reported as 4/10 on pain scale.  General health as reported by patient is excellent.  Pertinent medical history includes: none.      Other medical allergies details: sulfa.   Surgeries include:  None.    Current medications:  None.    Current occupation is nurse.   Primary job tasks include:  Prolonged standing, lifting/carrying and repetitive tasks.                Pt indicates her goal is to avoid surgery.  She also would like to return to working out: weight lifting classes, walking, cardio, hiking, treadmill.  \"I'd like to be able to go for a long walk with my dog.\"                    Objective:  Standing Alignment:              Knee:  Normal      Gait:  Pt ambulates without device without gross asymmetry; she noted that the sensation of numbness did come on almost right away with walking.                   Lumbar/SI " "Evaluation    Lumbar Myotomes:    T12-L3 (Hip Flex):  Left: 5    Right: 5  L2-4 (Quads):  Left:  5      L4 (Ankle DF):  Left:  5    Right:  5  L5 (Great Toe Ext): Left: 5    Right: 5   S1 (Toe Raise):  Left: 5    Right: 5      Lumbar Dermtomes:  Lumbar dermatomes: diffusely reduced sensation to light touch distal to mid thigh R compared to L in non dermatomal pattern.                Neural Tension/Mobility:      Left side:SLR  negative.     Right side:   SLR  negative.                                                  Knee Evaluation:  ROM:    AROM    Hyperextension:  Left:  8    Right: 4  Extension:  Left: 0    Right:  0  Flexion: Left: 135    Right: 125  PROM        Flexion: Left:   Right:  130  Pain: no discomfort R knee all directions with exception of \"tight\" end range AROM flexion    Strength:     Extension:  Left: 5/5    Pain:-      Right: 4/5    Pain:-  Flexion:  Left: 5/5    Pain:-      Right: 5-/5    Pain:-    Quad Set Left:  Good    Pain: -   Quad Set Right:  Good    Pain: -      Palpation:      Right knee tenderness present at:  Medial Joint Line  Right knee tenderness not present at:  Lateral Joint Line; Patellar Tendon; Popliteal; Patellar Medial; Patellar Lateral; Patellar Superior and Patellar Inferior  Edema:    Circumference:      Joint Line:  Left:  39 cm   Right:  39.1 cm    Mobility Testing:      Patellofemoral Medial:  Left: normal    Right: normal  Patellofemoral Lateral:  Left: normal    Right: normal              [facility] Lumbar Evaluation    Posture:  Sitting: good  Standing: good  Lordosis: WNL  Lateral Shift: no  Correction of Posture: no effect    Movement Loss:  Flexion (Flex): nil  Extension (EXT): min  Side Glide R (SG R): nil  Side Glide L (SG L): nil  Test Movements:  FIS: During: no effect  After: no effect  Pretest Movements: sensation of numbness to R foot during ambulation  Repeat FIS: During: no effect  After: no effect    EIS: During: no effect  After: no effect    Repeat EIS: " During: no effect  After: no effect      EIL: During: no effect  After: no effect    Repeat EIL: During: no effect  After: no effect                                               Valgus deviation SLS squat B, pain on R.    ROS    Assessment/Plan:    Patient is a 45 year old female with right side knee complaints.    Patient has the following significant findings with corresponding treatment plan.                Diagnosis 1:  R knee pain  Pain -  hot/cold therapy  Decreased ROM/flexibility - manual therapy and therapeutic exercise  Decreased strength - therapeutic exercise and therapeutic activities  Impaired muscle performance - neuro re-education  Decreased function - therapeutic activities    Therapy Evaluation Codes:   1) History comprised of:   Personal factors that impact the plan of care:      Overall behavior pattern and Time since onset of symptoms.    Comorbidity factors that impact the plan of care are:      None.     Medications impacting care: None.  2) Examination of Body Systems comprised of:   Body structures and functions that impact the plan of care:      Knee.   Activity limitations that impact the plan of care are:      Lifting and Squatting/kneeling.  3) Clinical presentation characteristics are:   Stable/Uncomplicated.  4) Decision-Making    Low complexity using standardized patient assessment instrument and/or measureable assessment of functional outcome.  Cumulative Therapy Evaluation is: Low complexity.    Previous and current functional limitations:  (See Goal Flow Sheet for this information)    Short term and Long term goals: (See Goal Flow Sheet for this information)     Communication ability:  Patient appears to be able to clearly communicate and understand verbal and written communication and follow directions correctly.  Treatment Explanation - The following has been discussed with the patient:   RX ordered/plan of care  Anticipated outcomes  Possible risks and side effects  This  patient would benefit from PT intervention to resume normal activities.   Rehab potential is good.    Frequency:  1 X week, once daily  Duration:  for 4 weeks  Consider transition to video if doing well at next visit but also need to monitor paresthesia.  Discharge Plan:  Achieve all LTG.  Independent in home treatment program.  Reach maximal therapeutic benefit.    Please refer to the daily flowsheet for treatment today, total treatment time and time spent performing 1:1 timed codes.

## 2021-02-01 ENCOUNTER — THERAPY VISIT (OUTPATIENT)
Dept: PHYSICAL THERAPY | Facility: CLINIC | Age: 46
End: 2021-02-01
Attending: PHYSICIAN ASSISTANT
Payer: COMMERCIAL

## 2021-02-01 DIAGNOSIS — M25.561 ACUTE PAIN OF RIGHT KNEE: ICD-10-CM

## 2021-02-01 PROCEDURE — 97161 PT EVAL LOW COMPLEX 20 MIN: CPT | Mod: GP | Performed by: PHYSICAL THERAPIST

## 2021-02-01 PROCEDURE — 97110 THERAPEUTIC EXERCISES: CPT | Mod: GP | Performed by: PHYSICAL THERAPIST

## 2021-02-10 ENCOUNTER — THERAPY VISIT (OUTPATIENT)
Dept: PHYSICAL THERAPY | Facility: CLINIC | Age: 46
End: 2021-02-10
Payer: COMMERCIAL

## 2021-02-10 DIAGNOSIS — M25.561 ACUTE PAIN OF RIGHT KNEE: ICD-10-CM

## 2021-02-10 PROCEDURE — 97110 THERAPEUTIC EXERCISES: CPT | Mod: GP | Performed by: PHYSICAL THERAPIST

## 2021-02-16 ENCOUNTER — THERAPY VISIT (OUTPATIENT)
Dept: PHYSICAL THERAPY | Facility: CLINIC | Age: 46
End: 2021-02-16
Payer: COMMERCIAL

## 2021-02-16 DIAGNOSIS — M25.561 ACUTE PAIN OF RIGHT KNEE: ICD-10-CM

## 2021-02-16 PROCEDURE — 97110 THERAPEUTIC EXERCISES: CPT | Mod: GP | Performed by: PHYSICAL THERAPIST

## 2021-02-16 ASSESSMENT — ACTIVITIES OF DAILY LIVING (ADL)
HOW_WOULD_YOU_RATE_THE_CURRENT_FUNCTION_OF_YOUR_KNEE_DURING_YOUR_USUAL_DAILY_ACTIVITIES_ON_A_SCALE_FROM_0_TO_100_WITH_100_BEING_YOUR_LEVEL_OF_KNEE_FUNCTION_PRIOR_TO_YOUR_INJURY_AND_0_BEING_THE_INABILITY_TO_PERFORM_ANY_OF_YOUR_USUAL_DAILY_ACTIVITIES?: 90
KNEE_ACTIVITY_OF_DAILY_LIVING_SUM: 59
WALK: ACTIVITY IS NOT DIFFICULT
STAND: ACTIVITY IS NOT DIFFICULT
HOW_WOULD_YOU_RATE_THE_OVERALL_FUNCTION_OF_YOUR_KNEE_DURING_YOUR_USUAL_DAILY_ACTIVITIES?: NEARLY NORMAL
AS_A_RESULT_OF_YOUR_KNEE_INJURY,_HOW_WOULD_YOU_RATE_YOUR_CURRENT_LEVEL_OF_DAILY_ACTIVITY?: NORMAL
LIMPING: I DO NOT HAVE THE SYMPTOM
RISE FROM A CHAIR: ACTIVITY IS NOT DIFFICULT
WEAKNESS: I DO NOT HAVE THE SYMPTOM
STIFFNESS: I DO NOT HAVE THE SYMPTOM
KNEEL ON THE FRONT OF YOUR KNEE: ACTIVITY IS VERY DIFFICULT
SWELLING: I DO NOT HAVE THE SYMPTOM
GO UP STAIRS: ACTIVITY IS NOT DIFFICULT
KNEE_ACTIVITY_OF_DAILY_LIVING_SCORE: 84.29
RAW_SCORE: 59
PAIN: I HAVE THE SYMPTOM BUT IT DOES NOT AFFECT MY ACTIVITY
GIVING WAY, BUCKLING OR SHIFTING OF KNEE: I HAVE THE SYMPTOM BUT IT DOES NOT AFFECT MY ACTIVITY
SIT WITH YOUR KNEE BENT: ACTIVITY IS VERY DIFFICULT
GO DOWN STAIRS: ACTIVITY IS NOT DIFFICULT
SQUAT: ACTIVITY IS MINIMALLY DIFFICULT

## 2021-02-16 NOTE — PROGRESS NOTES
Subjective:  HPI  Physical Exam       Knee Activity of Daily Living Score: 84.29            Objective:  System    Physical Exam    General     ROS    Assessment/Plan:    DISCHARGE REPORT    Progress reporting period is from 02/01/2021 to 02/16/2021.       SUBJECTIVE  Subjective: Pt reports continued improvement.  Gets occasional discomfort without predictable pattern.  Hasn't really tried deep squatting.    Current Pain level: 0/10.     Initial Pain level: 4/10.   Changes in function:  Yes (See Goal flowsheet attached for changes in current functional level)  Adverse reaction to treatment or activity: None    OBJECTIVE  Objective: Pt ambulates without device without gross asymmetry.  AROM 3-0-133 without discomfort.  No tenderness to palpation.  No discomfort resisted knee flexion and extension.     ASSESSMENT/PLAN  Updated problem list and treatment plan: Diagnosis 1:  Knee pain -- home program  STG/LTGs have been met or progress has been made towards goals:  Yes (See Goal flow sheet completed today.)  Assessment of Progress: The patient's condition is improving.  Self Management Plans:  Patient is independent in a home treatment program.  I have re-evaluated this patient and find that the nature, scope, duration and intensity of the therapy is appropriate for the medical condition of the patient.  Brittni continues to require the following intervention to meet STG and LTG's:  PT intervention is no longer required to meet STG/LTG.    Recommendations:  Given progress, pt agrees discharge to [facility] but will let me know if there are further issues.    Please refer to the daily flowsheet for treatment today, total treatment time and time spent performing 1:1 timed codes.

## 2021-04-11 ENCOUNTER — HEALTH MAINTENANCE LETTER (OUTPATIENT)
Age: 46
End: 2021-04-11

## 2021-04-19 ENCOUNTER — OFFICE VISIT (OUTPATIENT)
Dept: ORTHOPEDICS | Facility: CLINIC | Age: 46
End: 2021-04-19
Payer: COMMERCIAL

## 2021-04-19 ENCOUNTER — TELEPHONE (OUTPATIENT)
Dept: ORTHOPEDICS | Facility: CLINIC | Age: 46
End: 2021-04-19

## 2021-04-19 VITALS
DIASTOLIC BLOOD PRESSURE: 67 MMHG | BODY MASS INDEX: 26.53 KG/M2 | HEIGHT: 67 IN | WEIGHT: 169 LBS | SYSTOLIC BLOOD PRESSURE: 120 MMHG

## 2021-04-19 DIAGNOSIS — S83.241D TEAR OF MEDIAL MENISCUS OF RIGHT KNEE, CURRENT, UNSPECIFIED TEAR TYPE, SUBSEQUENT ENCOUNTER: Primary | ICD-10-CM

## 2021-04-19 DIAGNOSIS — S83.281D TEAR OF LATERAL MENISCUS OF RIGHT KNEE, CURRENT, UNSPECIFIED TEAR TYPE, SUBSEQUENT ENCOUNTER: ICD-10-CM

## 2021-04-19 PROCEDURE — 99213 OFFICE O/P EST LOW 20 MIN: CPT | Performed by: ORTHOPAEDIC SURGERY

## 2021-04-19 ASSESSMENT — MIFFLIN-ST. JEOR: SCORE: 1436.27

## 2021-04-19 ASSESSMENT — PAIN SCALES - GENERAL: PAINLEVEL: SEVERE PAIN (6)

## 2021-04-19 NOTE — LETTER
"    4/19/2021         RE: Brittni Massey  [id number] [address] [facility] [id number]        Dear Colleague,    Thank you for referring your patient, Brittni Massey, to the [facility]. Please see a copy of my visit note below.    CHIEF COMPLAINT:   Chief Complaint   Patient presents with     Right Knee - Follow Up     Right knee pain. Had cortisone on 1/25/21 and Physical Therapy this went well and pain was pretty much gone. Was back to working out and all her regular routines.     Knee Pain      2 weeks ago she woke up and pain was back, all medial, and more intense then prior to the injection.         HISTORY OF PRESENT ILLNESS    Brittni Massey is a 46 year old female seen for followup evaluation of ongoing right knee pain with no known injury. Was seen for the same 1/25/2021 and received an injection, which worked well until 2 weeks ago, quite well. Was able to resume normal activities, exercise, biking without pain. Now pain is back. Locates pain along the inner aspect of the knee again.    Pain has been present since ~11/2020. Onset of pain after starting an exercise regimen at home, just came on, has been ever since. Locates pain along the inner aspect of the knee and behind the knee. Pain is aggravated with activity, walking. Has pain at rest, worse at night. Has noticed some swelling but that is improved. Feels the knee \"locks\" all the time.    Denies prior knee problems.    On her feet 8 hours/day as a nurse.    Present symptoms: pain medially  and posteriorly, pain sharp, shooting, dull/achy , mild pain, mild swelling, +locking.    Pain severity: 6/10  Frequency of symptoms: are constant  Exacerbating Factors: weight bearing, stairs, prolonged standing  Relieving Factors: rest, sitting  Night Pain: Yes  Pain while at rest: Yes   Numbness or tingling: No   Patient has tried:     NSAIDS: Yes      Physical Therapy: No      Activity modification: Yes      Bracing: " "No      Injections: Yes 1/25/2021 with good relief      Ice: Yes      Assistive device:  No     Other: heat      Other PMH:  has a past medical history of H/O colposcopy with cervical biopsy (3/16/15) and LSIL (low grade squamous intraepithelial lesion) on Pap smear (2/24/15).  Patient Active Problem List   Diagnosis     CARDIOVASCULAR SCREENING; LDL GOAL LESS THAN 160     Dysmenorrhea       Surgical Hx:  has a past surgical history that includes surgical history of -  (1990).    Medications: No current outpatient medications on file.    Current Facility-Administered Medications:      bupivacaine (MARCAINE) 0.25 % injection 4 mL, 4 mL, , , Ulisses Hyatt MD, 4 mL at 01/25/21 1032     methylPREDNISolone (DEPO-MEDROL) injection 80 mg, 80 mg, , , Ulisses Hyatt MD, 80 mg at 01/25/21 1032    Allergies:   Allergies   Allergen Reactions     Sulfa Drugs Rash       Social Hx: Nurse.   reports that she has never smoked. She has never used smokeless tobacco. She reports current alcohol use. She reports that she does not use drugs.    Family Hx: family history includes Cancer in her paternal grandfather; Circulatory in her maternal grandfather; Heart Disease in her mother; Hypertension in her father.    REVIEW OF SYSTEMS:   CONSTITUTIONAL:NEGATIVE for fever, chills, change in weight  INTEGUMENTARY/SKIN: NEGATIVE for worrisome rashes, moles or lesions  MUSCULOSKELETAL:See HPI above  NEURO: NEGATIVE for weakness, dizziness or paresthesias    PHYSICAL EXAM:  /67   Ht 1.689 m (5' 6.5\")   Wt 76.7 kg (169 lb)   BMI 26.87 kg/m     GENERAL APPEARANCE: healthy, alert, no distress  SKIN: no suspicious lesions or rashes  NEURO: Normal strength and tone, mentation intact and speech normal  PSYCH:  mentation appears normal and affect normal, not anxious  RESPIRATORY: No increased work of breathing.      BILATERAL LOWER EXTREMITIES:  Gait: slight favors the right   Alignment: varus  No gross deformities or masses.  No Quad " atrophy, strength normal.  Intact sensation deep peroneal nerve, superficial peroneal nerve, med/lat tibial nerve, sural nerve, saphenous nerve  Intact EHL, EDL, TA, FHL, GS, quadriceps hamstrings and hip flexors  Toes warm and well perfused, brisk capillary refill. Palpable 2+ dp pulses.  Bilateral calf soft and nttp or squeeze.  Edema: none    RIGHT KNEE EXAM:    Skin: intact, no ecchymosis or erythema  ROM: full extension to 130 flexion, posterior discomfort with extension and medial and anterior discomfort with flexion  Tight hamstrings on straight leg raise.  Effusion: trace  Tender: medial joint line, anteromedial knee with possible palpable plica, lesser extent postero lateral joint line, pes  McMurrays: positive medially.    MCL: stable, and non-painful at both 0 and 30 degrees knee flexion  Varus stress: stable, and non-painful at both 0 and 30 degrees knee flexion  Lachmans: neg, firm endpoint  Posterior Drawer stable  Patellofemoral joint:                Apprehension: negative              Crepitations: minimal   Grind: positive.    LEFT KNEE EXAM:    Skin: intact, no ecchymosis or erythema  ROM: full extension to 1354 flexion  Tight hamstrings on straight leg raise.  Effusion: none  Tender: NTTP med/lat joint line, anterior or posterior knee  McMurrays: negative    MCL: stable, and non-painful at both 0 and 30 degrees knee flexion  Varus stress: stable, and non-painful at both 0 and 30 degrees knee flexion  Lachmans: neg, firm endpoint  Posterior Drawer stable  Patellofemoral joint:                Apprehension: negative              Crepitations: minimal    X-RAY: no new images today. 3 views right knee from 1/25/2021 were again reviewed in clinic today. On my review, no obvious fractures or dislocations. Mild medial compartment narrowing.    MRI:  MRI right knee from 1/19/2021 was again reviewed in clinic today.     1. Increased intrasubstance signal of the posterior horn of the  lateral meniscus  consistent with meniscal degeneration, additional  horizontal oblique component with a subtle communication with the  tibial articular cartilage that could represent a subtle meniscal  tear. No displaced fragments off flaps  2. Free edge fraying of the posterior horn of the medial meniscus with  associated mild extrusion.  3. 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 Modified Outerbridge grade II chondromalacia of the  lateral compartment. Modified Outerbridge grade II chondromalacia of  the patellofemoral joint.  4. Moderate joint effusion.       ASSESSMENT/PLAN: Brittni Massey is a 46 year old female with chronic right knee pain, chonromalacia, possible medial meniscus and lateral meniscus tears.     * discussed injury with patient, what appears to be possible medial and lateral  meniscal tear on MRI, as well as some underlying arthritic changes, which is consistent with symptoms and physical examination findings.     * Discussed treatment options including nonoperative treatment with continued rest, ice, elevation, activity modification, NSAIDS and Physical Therapy, bracing and potential injections versus surgical treatment with arthroscopy and meniscal repair versus debridement, possible chondral debridement. Risks and benefits of each discussed in detail.  * in the setting of underlying chondrosis, predictability of arthroscopy is uncertain, unless mechanical symptoms present due to the meniscus tear.    * surgical risks discussed: bleeding, infection, pain, scar, damage to adjacent structures (nerve, vessels, cartilage), stiffness, post-traumatic arthritis, failure to relieve symptoms, recurrence of symptoms, blood clots (DVT), pulmonary emolism, risks of anesthesia and death. This surgery is not intended nor expected to alleviate arthritic pain symptoms, nor will it treat or correct underlying arthritic changes. Arthritis and symptoms related to arthritis could worsen with  arthroscopy and meniscal and/or chondral debridement. Patient understands.    * understanding the risks of surgery, patient elected to proceed with surgery.  * plan right knee arthroscopy, meniscal debridement versus repair, possible chondral debridement, [email or web address]  * needs H+P from primary care provider prior to surgery  * covid testing 4 days prior to surgery.  * return to clinic 2 weeks postoperative, wound check, suture removal.    * all questions addressed and answered prior to discharge from clinic today.  * patient to call if any questions or concerns in the meantime.        Ulisses Hyatt M.D., M.S.  Dept. of Orthopaedic Surgery  [facility]    Procedures          Again, thank you for allowing me to participate in the care of your patient.        Sincerely,        Ulisses Hyatt MD

## 2021-04-19 NOTE — TELEPHONE ENCOUNTER
Type of surgery: arthroscopy,knee,right meniscal and chondral debridement (right)  CPT 79678   Tear of medial meniscus of right knee, current, unspecified tear type, subsequent encounter S83.241D     Tear of lateral meniscus of right knee, current, unspecified tear type, subsequent encounter S83.281D    [facility] of surgery:  [facility]  Date and time of surgery: 6-3-21  TBD  Surgeon: Dr Hyatt  Pre-Op Appt Date: 5-17-21  Post-Op Appt Date: 6-17-21   Packet sent out: Yes  Pre-cert/Authorization completed:    No prior auth needed, per [employer] online list  Date: 04/19/2021    Thank you,   Kaylen Jacques   Prior Authorization [facility]  [phone]

## 2021-04-19 NOTE — PROGRESS NOTES
"CHIEF COMPLAINT:   Chief Complaint   Patient presents with     Right Knee - Follow Up     Right knee pain. Had cortisone on 1/25/21 and Physical Therapy this went well and pain was pretty much gone. Was back to working out and all her regular routines.     Knee Pain      2 weeks ago she woke up and pain was back, all medial, and more intense then prior to the injection.         HISTORY OF PRESENT ILLNESS    Brittni Massey is a 46 year old female seen for followup evaluation of ongoing right knee pain with no known injury. Was seen for the same 1/25/2021 and received an injection, which worked well until 2 weeks ago, quite well. Was able to resume normal activities, exercise, biking without pain. Now pain is back. Locates pain along the inner aspect of the knee again.    Pain has been present since ~11/2020. Onset of pain after starting an exercise regimen at home, just came on, has been ever since. Locates pain along the inner aspect of the knee and behind the knee. Pain is aggravated with activity, walking. Has pain at rest, worse at night. Has noticed some swelling but that is improved. Feels the knee \"locks\" all the time.    Denies prior knee problems.    On her feet 8 hours/day as a nurse.    Present symptoms: pain medially  and posteriorly, pain sharp, shooting, dull/achy , mild pain, mild swelling, +locking.    Pain severity: 6/10  Frequency of symptoms: are constant  Exacerbating Factors: weight bearing, stairs, prolonged standing  Relieving Factors: rest, sitting  Night Pain: Yes  Pain while at rest: Yes   Numbness or tingling: No   Patient has tried:     NSAIDS: Yes      Physical Therapy: No      Activity modification: Yes      Bracing: No      Injections: Yes 1/25/2021 with good relief      Ice: Yes      Assistive device:  No     Other: heat      Other PMH:  has a past medical history of H/O colposcopy with cervical biopsy (3/16/15) and LSIL (low grade squamous intraepithelial lesion) on Pap smear " "(2/24/15).  Patient Active Problem List   Diagnosis     CARDIOVASCULAR SCREENING; LDL GOAL LESS THAN 160     Dysmenorrhea       Surgical Hx:  has a past surgical history that includes surgical history of -  (1990).    Medications: No current outpatient medications on file.    Current Facility-Administered Medications:      bupivacaine (MARCAINE) 0.25 % injection 4 mL, 4 mL, , , Ulisses Hyatt MD, 4 mL at 01/25/21 1032     methylPREDNISolone (DEPO-MEDROL) injection 80 mg, 80 mg, , , Ulisses Hyatt MD, 80 mg at 01/25/21 1032    Allergies:   Allergies   Allergen Reactions     Sulfa Drugs Rash       Social Hx: Nurse.   reports that she has never smoked. She has never used smokeless tobacco. She reports current alcohol use. She reports that she does not use drugs.    Family Hx: family history includes Cancer in her paternal grandfather; Circulatory in her maternal grandfather; Heart Disease in her mother; Hypertension in her father.    REVIEW OF SYSTEMS:   CONSTITUTIONAL:NEGATIVE for fever, chills, change in weight  INTEGUMENTARY/SKIN: NEGATIVE for worrisome rashes, moles or lesions  MUSCULOSKELETAL:See HPI above  NEURO: NEGATIVE for weakness, dizziness or paresthesias    PHYSICAL EXAM:  /67   Ht 1.689 m (5' 6.5\")   Wt 76.7 kg (169 lb)   BMI 26.87 kg/m     GENERAL APPEARANCE: healthy, alert, no distress  SKIN: no suspicious lesions or rashes  NEURO: Normal strength and tone, mentation intact and speech normal  PSYCH:  mentation appears normal and affect normal, not anxious  RESPIRATORY: No increased work of breathing.      BILATERAL LOWER EXTREMITIES:  Gait: slight favors the right   Alignment: varus  No gross deformities or masses.  No Quad atrophy, strength normal.  Intact sensation deep peroneal nerve, superficial peroneal nerve, med/lat tibial nerve, sural nerve, saphenous nerve  Intact EHL, EDL, TA, FHL, GS, quadriceps hamstrings and hip flexors  Toes warm and well perfused, brisk capillary refill. " Palpable 2+ dp pulses.  Bilateral calf soft and nttp or squeeze.  Edema: none    RIGHT KNEE EXAM:    Skin: intact, no ecchymosis or erythema  ROM: full extension to 130 flexion, posterior discomfort with extension and medial and anterior discomfort with flexion  Tight hamstrings on straight leg raise.  Effusion: trace  Tender: medial joint line, anteromedial knee with possible palpable plica, lesser extent postero lateral joint line, pes  McMurrays: positive medially.    MCL: stable, and non-painful at both 0 and 30 degrees knee flexion  Varus stress: stable, and non-painful at both 0 and 30 degrees knee flexion  Lachmans: neg, firm endpoint  Posterior Drawer stable  Patellofemoral joint:                Apprehension: negative              Crepitations: minimal   Grind: positive.    LEFT KNEE EXAM:    Skin: intact, no ecchymosis or erythema  ROM: full extension to 1354 flexion  Tight hamstrings on straight leg raise.  Effusion: none  Tender: NTTP med/lat joint line, anterior or posterior knee  McMurrays: negative    MCL: stable, and non-painful at both 0 and 30 degrees knee flexion  Varus stress: stable, and non-painful at both 0 and 30 degrees knee flexion  Lachmans: neg, firm endpoint  Posterior Drawer stable  Patellofemoral joint:                Apprehension: negative              Crepitations: minimal    X-RAY: no new images today. 3 views right knee from 1/25/2021 were again reviewed in clinic today. On my review, no obvious fractures or dislocations. Mild medial compartment narrowing.    MRI:  MRI right knee from 1/19/2021 was again reviewed in clinic today.     1. Increased intrasubstance signal of the posterior horn of the  lateral meniscus consistent with meniscal degeneration, additional  horizontal oblique component with a subtle communication with the  tibial articular cartilage that could represent a subtle meniscal  tear. No displaced fragments off flaps  2. Free edge fraying of the posterior horn of  the medial meniscus with  associated mild extrusion.  3. 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 Modified Outerbridge grade II chondromalacia of the  lateral compartment. Modified Outerbridge grade II chondromalacia of  the patellofemoral joint.  4. Moderate joint effusion.       ASSESSMENT/PLAN: Brittni Massey is a 46 year old female with chronic right knee pain, chonromalacia, possible medial meniscus and lateral meniscus tears.     * discussed injury with patient, what appears to be possible medial and lateral  meniscal tear on MRI, as well as some underlying arthritic changes, which is consistent with symptoms and physical examination findings.     * Discussed treatment options including nonoperative treatment with continued rest, ice, elevation, activity modification, NSAIDS and Physical Therapy, bracing and potential injections versus surgical treatment with arthroscopy and meniscal repair versus debridement, possible chondral debridement. Risks and benefits of each discussed in detail.  * in the setting of underlying chondrosis, predictability of arthroscopy is uncertain, unless mechanical symptoms present due to the meniscus tear.    * surgical risks discussed: bleeding, infection, pain, scar, damage to adjacent structures (nerve, vessels, cartilage), stiffness, post-traumatic arthritis, failure to relieve symptoms, recurrence of symptoms, blood clots (DVT), pulmonary emolism, risks of anesthesia and death. This surgery is not intended nor expected to alleviate arthritic pain symptoms, nor will it treat or correct underlying arthritic changes. Arthritis and symptoms related to arthritis could worsen with arthroscopy and meniscal and/or chondral debridement. Patient understands.    * understanding the risks of surgery, patient elected to proceed with surgery.  * plan right knee arthroscopy, meniscal debridement versus repair, possible chondral debridement, [email or web address]  *  needs H+P from primary care provider prior to surgery  * covid testing 4 days prior to surgery.  * return to clinic 2 weeks postoperative, wound check, suture removal.    * all questions addressed and answered prior to discharge from clinic today.  * patient to call if any questions or concerns in the meantime.        Ulisses Hyatt M.D., M.S.  Dept. of Orthopaedic Surgery  [facility]    Procedures

## 2021-05-17 ENCOUNTER — OFFICE VISIT (OUTPATIENT)
Dept: FAMILY MEDICINE | Facility: CLINIC | Age: 46
End: 2021-05-17
Payer: COMMERCIAL

## 2021-05-17 VITALS
SYSTOLIC BLOOD PRESSURE: 136 MMHG | DIASTOLIC BLOOD PRESSURE: 80 MMHG | WEIGHT: 166 LBS | OXYGEN SATURATION: 99 % | TEMPERATURE: 98.6 F | HEART RATE: 118 BPM | BODY MASS INDEX: 26.39 KG/M2

## 2021-05-17 DIAGNOSIS — S83.241D TEAR OF MEDIAL MENISCUS OF RIGHT KNEE, CURRENT, UNSPECIFIED TEAR TYPE, SUBSEQUENT ENCOUNTER: ICD-10-CM

## 2021-05-17 DIAGNOSIS — Z01.818 PREOP GENERAL PHYSICAL EXAM: Primary | ICD-10-CM

## 2021-05-17 LAB
HCG UR QL: NEGATIVE
HGB BLD-MCNC: 13.4 G/DL (ref 11.7–15.7)

## 2021-05-17 PROCEDURE — 36415 COLL VENOUS BLD VENIPUNCTURE: CPT | Performed by: PHYSICIAN ASSISTANT

## 2021-05-17 PROCEDURE — 81025 URINE PREGNANCY TEST: CPT | Performed by: PHYSICIAN ASSISTANT

## 2021-05-17 PROCEDURE — 99214 OFFICE O/P EST MOD 30 MIN: CPT | Performed by: PHYSICIAN ASSISTANT

## 2021-05-17 PROCEDURE — 85018 HEMOGLOBIN: CPT | Performed by: PHYSICIAN ASSISTANT

## 2021-05-17 ASSESSMENT — PAIN SCALES - GENERAL: PAINLEVEL: NO PAIN (0)

## 2021-05-17 NOTE — H&P (VIEW-ONLY)
[facility]  [address] [address]  Phone: [phone]  Primary Provider: Hiral Whipple  Pre-op Performing Provider: KEHR, KRISTEN M      PREOPERATIVE EVALUATION:  Today's date: 5/17/2021    Brittni Massey is a 46 year old female who presents for a preoperative evaluation.    Surgical Information:  Surgery/Procedure:ARTHROSCOPY, KNEE, right meniscal and chondral debridement   Surgery Location:  OR  Surgeon: Ulisses Albarran  Surgery Date: 06/03/21  Time of Surgery: TBD  Where patient plans to recover: At home with family  Fax number for surgical facility: Note does not need to be faxed, will be available electronically in Epic.    Type of Anesthesia Anticipated: to be determined    Assessment & Plan     The proposed surgical procedure is considered LOW risk.    Preop general physical exam  Tear of medial meniscus of right knee, current, unspecified tear type, subsequent encounter  - Hemoglobin  - HCG Qual, Urine ([id number])         Risks and Recommendations:  The patient has the following additional risks and recommendations for perioperative complications:   - No identified additional risk factors other than previously addressed    Medication Instructions:  Patient is to take all scheduled medications on the day of surgery    RECOMMENDATION:  APPROVAL GIVEN to proceed with proposed procedure, without further diagnostic evaluation.                      Subjective     HPI related to upcoming procedure: Brittni is having pain in her right knee due to meniscal tear, she will be having surgery to repair.      Preop Questions 5/17/2021   1. Have you ever had a heart attack or stroke? No   2. Have you ever had surgery on your heart or blood vessels, such as a stent placement, a coronary artery bypass, or surgery on an artery in your head, neck, heart, or legs? No   3. Do you have chest pain with activity? No   4. Do you have a history of  heart failure? No   5. Do you  currently have a cold, bronchitis or symptoms of other infection? No   6. Do you have a cough, shortness of breath, or wheezing? No   7. Do you or anyone in your family have previous history of blood clots? No   8. Do you or does anyone in your family have a serious bleeding problem such as prolonged bleeding following surgeries or cuts? No   9. Have you ever had problems with anemia or been told to take iron pills? No   10. Have you had any abnormal blood loss such as black, tarry or bloody stools, or abnormal vaginal bleeding? No   11. Have you ever had a blood transfusion? No   12. Are you willing to have a blood transfusion if it is medically needed before, during, or after your surgery? Yes   13. Have you or any of your relatives ever had problems with anesthesia? No   14. Do you have sleep apnea, excessive snoring or daytime drowsiness? No   15. Do you have any artifical heart valves or other implanted medical devices like a pacemaker, defibrillator, or continuous glucose monitor? No   16. Do you have artificial joints? No   17. Are you allergic to latex? No   18. Is there any chance that you may be pregnant? No       Health Care Directive:  Patient does not have a Health Care Directive or Living Will: Discussed advance care planning with patient; however, patient declined at this time.    Preoperative Review of :   reviewed - no record of controlled substances prescribed.  .    Status of Chronic Conditions:  NO CHRONIC CONDITIONS    Review of Systems  Constitutional, neuro, ENT, endocrine, pulmonary, cardiac, gastrointestinal, genitourinary, musculoskeletal, integument and psychiatric systems are negative, except as otherwise noted.    Patient Active Problem List    Diagnosis Date Noted     Tear of medial meniscus of right knee, current, unspecified tear type, subsequent encounter 04/19/2021     Priority: Medium     Added automatically from request for surgery [id number]       Tear of lateral meniscus of  right knee, current, unspecified tear type, subsequent encounter 04/19/2021     Priority: Medium     Added automatically from request for surgery [id number]       Dysmenorrhea 04/17/2012     Priority: Medium     CARDIOVASCULAR SCREENING; LDL GOAL LESS THAN 160 10/31/2010     Priority: Medium      Past Medical History:   Diagnosis Date     H/O colposcopy with cervical biopsy 3/16/15    ECC - LSIL, Bx - NO 1     LSIL (low grade squamous intraepithelial lesion) on Pap smear 2/24/15    + HR HPV     Past Surgical History:   Procedure Laterality Date     SURGICAL HISTORY OF -   1990    Shaved heel bone right foot.     No current outpatient medications on file.       Allergies   Allergen Reactions     Sulfa Drugs Rash        Social History     Tobacco Use     Smoking status: Never Smoker     Smokeless tobacco: Never Used     Tobacco comment: Nonsmoking household   Substance Use Topics     Alcohol use: Yes     Comment: Very very rarely     Family History   Problem Relation Age of Onset     Heart Disease Mother         MI before age 40. congenital defect.      Hypertension Father      Circulatory Maternal Grandfather         brain aneurysm     Cancer Paternal Grandfather         throat and mouth, smoker     History   Drug Use No         Objective     /80   Pulse 118   Temp 98.6  F (37  C) (Tympanic)   Wt 75.3 kg (166 lb)   SpO2 99%   BMI 26.39 kg/m      Physical Exam    GENERAL APPEARANCE: healthy, alert and no distress     EYES: EOMI, PERRL     HENT: ear canals and TM's normal and nose and mouth without ulcers or lesions     NECK: no adenopathy, no asymmetry, masses, or scars and thyroid normal to palpation     RESP: lungs clear to auscultation - no rales, rhonchi or wheezes     CV: regular rates and rhythm, normal S1 S2, no S3 or S4 and no murmur, click or rub     ABDOMEN:  soft, nontender, no HSM or masses and bowel sounds normal     MS: extremities normal- no gross deformities noted, no evidence of inflammation  in joints, FROM in all extremities.     SKIN: no suspicious lesions or rashes     NEURO: Normal strength and tone, sensory exam grossly normal, mentation intact and speech normal     PSYCH: mentation appears normal. and affect normal/bright     LYMPHATICS: No cervical adenopathy    No results for input(s): HGB, PLT, INR, NA, POTASSIUM, CR, A1C in the last 96331 hours.     Diagnostics:  Recent Results (from the past 24 hour(s))   Hemoglobin    Collection Time: 05/17/21  1:40 PM   Result Value Ref Range    Hemoglobin 13.4 11.7 - 15.7 g/dL   HCG Qual, Urine ([id number])    Collection Time: 05/17/21  1:40 PM   Result Value Ref Range    HCG Qual Urine Negative NEG^Negative      No EKG required, no history of coronary heart disease, significant arrhythmia, peripheral arterial disease or other structural heart disease.    Revised Cardiac Risk Index (RCRI):  The patient has the following serious cardiovascular risks for perioperative complications:   - No serious cardiac risks = 0 points     RCRI Interpretation: 0 points: Class I (very low risk - 0.4% complication rate)           Signed Electronically by: Kristen M. Kehr, PA-C  Copy of this evaluation report is provided to requesting physician.

## 2021-05-17 NOTE — NURSING NOTE
"Chief Complaint   Patient presents with     Pre-Op Exam       Initial /80   Pulse 118   Temp 98.6  F (37  C) (Tympanic)   Wt 75.3 kg (166 lb)   SpO2 99%   BMI 26.39 kg/m   Estimated body mass index is 26.39 kg/m  as calculated from the following:    Height as of 4/19/21: 1.689 m (5' 6.5\").    Weight as of this encounter: 75.3 kg (166 lb).  Medication Reconciliation: complete    TALIB Toney MA    "

## 2021-05-17 NOTE — PATIENT INSTRUCTIONS

## 2021-05-17 NOTE — PROGRESS NOTES
[facility]  [address] [address]  Phone: [phone]  Primary Provider: Hiral Whipple  Pre-op Performing Provider: KEHR, KRISTEN M      PREOPERATIVE EVALUATION:  Today's date: 5/17/2021    Brittni Massey is a 46 year old female who presents for a preoperative evaluation.    Surgical Information:  Surgery/Procedure:ARTHROSCOPY, KNEE, right meniscal and chondral debridement   Surgery Location:  OR  Surgeon: Ulisses Albarran  Surgery Date: 06/03/21  Time of Surgery: TBD  Where patient plans to recover: At home with family  Fax number for surgical facility: Note does not need to be faxed, will be available electronically in Epic.    Type of Anesthesia Anticipated: to be determined    Assessment & Plan     The proposed surgical procedure is considered LOW risk.    Preop general physical exam  Tear of medial meniscus of right knee, current, unspecified tear type, subsequent encounter  - Hemoglobin  - HCG Qual, Urine ([id number])         Risks and Recommendations:  The patient has the following additional risks and recommendations for perioperative complications:   - No identified additional risk factors other than previously addressed    Medication Instructions:  Patient is to take all scheduled medications on the day of surgery    RECOMMENDATION:  APPROVAL GIVEN to proceed with proposed procedure, without further diagnostic evaluation.                      Subjective     HPI related to upcoming procedure: Brittni is having pain in her right knee due to meniscal tear, she will be having surgery to repair.      Preop Questions 5/17/2021   1. Have you ever had a heart attack or stroke? No   2. Have you ever had surgery on your heart or blood vessels, such as a stent placement, a coronary artery bypass, or surgery on an artery in your head, neck, heart, or legs? No   3. Do you have chest pain with activity? No   4. Do you have a history of  heart failure? No   5. Do you  currently have a cold, bronchitis or symptoms of other infection? No   6. Do you have a cough, shortness of breath, or wheezing? No   7. Do you or anyone in your family have previous history of blood clots? No   8. Do you or does anyone in your family have a serious bleeding problem such as prolonged bleeding following surgeries or cuts? No   9. Have you ever had problems with anemia or been told to take iron pills? No   10. Have you had any abnormal blood loss such as black, tarry or bloody stools, or abnormal vaginal bleeding? No   11. Have you ever had a blood transfusion? No   12. Are you willing to have a blood transfusion if it is medically needed before, during, or after your surgery? Yes   13. Have you or any of your relatives ever had problems with anesthesia? No   14. Do you have sleep apnea, excessive snoring or daytime drowsiness? No   15. Do you have any artifical heart valves or other implanted medical devices like a pacemaker, defibrillator, or continuous glucose monitor? No   16. Do you have artificial joints? No   17. Are you allergic to latex? No   18. Is there any chance that you may be pregnant? No       Health Care Directive:  Patient does not have a Health Care Directive or Living Will: Discussed advance care planning with patient; however, patient declined at this time.    Preoperative Review of :   reviewed - no record of controlled substances prescribed.  .    Status of Chronic Conditions:  NO CHRONIC CONDITIONS    Review of Systems  Constitutional, neuro, ENT, endocrine, pulmonary, cardiac, gastrointestinal, genitourinary, musculoskeletal, integument and psychiatric systems are negative, except as otherwise noted.    Patient Active Problem List    Diagnosis Date Noted     Tear of medial meniscus of right knee, current, unspecified tear type, subsequent encounter 04/19/2021     Priority: Medium     Added automatically from request for surgery [id number]       Tear of lateral meniscus of  right knee, current, unspecified tear type, subsequent encounter 04/19/2021     Priority: Medium     Added automatically from request for surgery [id number]       Dysmenorrhea 04/17/2012     Priority: Medium     CARDIOVASCULAR SCREENING; LDL GOAL LESS THAN 160 10/31/2010     Priority: Medium      Past Medical History:   Diagnosis Date     H/O colposcopy with cervical biopsy 3/16/15    ECC - LSIL, Bx - NO 1     LSIL (low grade squamous intraepithelial lesion) on Pap smear 2/24/15    + HR HPV     Past Surgical History:   Procedure Laterality Date     SURGICAL HISTORY OF -   1990    Shaved heel bone right foot.     No current outpatient medications on file.       Allergies   Allergen Reactions     Sulfa Drugs Rash        Social History     Tobacco Use     Smoking status: Never Smoker     Smokeless tobacco: Never Used     Tobacco comment: Nonsmoking household   Substance Use Topics     Alcohol use: Yes     Comment: Very very rarely     Family History   Problem Relation Age of Onset     Heart Disease Mother         MI before age 40. congenital defect.      Hypertension Father      Circulatory Maternal Grandfather         brain aneurysm     Cancer Paternal Grandfather         throat and mouth, smoker     History   Drug Use No         Objective     /80   Pulse 118   Temp 98.6  F (37  C) (Tympanic)   Wt 75.3 kg (166 lb)   SpO2 99%   BMI 26.39 kg/m      Physical Exam    GENERAL APPEARANCE: healthy, alert and no distress     EYES: EOMI, PERRL     HENT: ear canals and TM's normal and nose and mouth without ulcers or lesions     NECK: no adenopathy, no asymmetry, masses, or scars and thyroid normal to palpation     RESP: lungs clear to auscultation - no rales, rhonchi or wheezes     CV: regular rates and rhythm, normal S1 S2, no S3 or S4 and no murmur, click or rub     ABDOMEN:  soft, nontender, no HSM or masses and bowel sounds normal     MS: extremities normal- no gross deformities noted, no evidence of inflammation  in joints, FROM in all extremities.     SKIN: no suspicious lesions or rashes     NEURO: Normal strength and tone, sensory exam grossly normal, mentation intact and speech normal     PSYCH: mentation appears normal. and affect normal/bright     LYMPHATICS: No cervical adenopathy    No results for input(s): HGB, PLT, INR, NA, POTASSIUM, CR, A1C in the last 01171 hours.     Diagnostics:  Recent Results (from the past 24 hour(s))   Hemoglobin    Collection Time: 05/17/21  1:40 PM   Result Value Ref Range    Hemoglobin 13.4 11.7 - 15.7 g/dL   HCG Qual, Urine ([id number])    Collection Time: 05/17/21  1:40 PM   Result Value Ref Range    HCG Qual Urine Negative NEG^Negative      No EKG required, no history of coronary heart disease, significant arrhythmia, peripheral arterial disease or other structural heart disease.    Revised Cardiac Risk Index (RCRI):  The patient has the following serious cardiovascular risks for perioperative complications:   - No serious cardiac risks = 0 points     RCRI Interpretation: 0 points: Class I (very low risk - 0.4% complication rate)           Signed Electronically by: Kristen M. Kehr, PA-C  Copy of this evaluation report is provided to requesting physician.

## 2021-05-18 DIAGNOSIS — Z11.59 ENCOUNTER FOR SCREENING FOR OTHER VIRAL DISEASES: ICD-10-CM

## 2021-05-19 NOTE — TELEPHONE ENCOUNTER
Patient LVM requesting diagnosis code for their surgery to pre authorize with their insurance. Would like a call back.

## 2021-05-19 NOTE — TELEPHONE ENCOUNTER
Called and spoke to patient. She notes her insurance wants us to call and prior auth the surgery. I let her know that our pre cert department does that. She thanked me for calling.  Hansa Langley Certified Medical Assistant

## 2021-05-30 DIAGNOSIS — Z11.59 ENCOUNTER FOR SCREENING FOR OTHER VIRAL DISEASES: ICD-10-CM

## 2021-05-30 LAB
SARS-COV-2 RNA RESP QL NAA+PROBE: NORMAL
SPECIMEN SOURCE: NORMAL

## 2021-05-30 PROCEDURE — U0003 INFECTIOUS AGENT DETECTION BY NUCLEIC ACID (DNA OR RNA); SEVERE ACUTE RESPIRATORY SYNDROME CORONAVIRUS 2 (SARS-COV-2) (CORONAVIRUS DISEASE [COVID-19]), AMPLIFIED PROBE TECHNIQUE, MAKING USE OF HIGH THROUGHPUT TECHNOLOGIES AS DESCRIBED BY CMS-2020-01-R: HCPCS | Performed by: ORTHOPAEDIC SURGERY

## 2021-05-30 PROCEDURE — U0005 INFEC AGEN DETEC AMPLI PROBE: HCPCS | Performed by: ORTHOPAEDIC SURGERY

## 2021-05-31 LAB
LABORATORY COMMENT REPORT: NORMAL
SARS-COV-2 RNA RESP QL NAA+PROBE: NEGATIVE
SPECIMEN SOURCE: NORMAL

## 2021-06-02 ENCOUNTER — ANESTHESIA EVENT (OUTPATIENT)
Dept: SURGERY | Facility: AMBULATORY SURGERY CENTER | Age: 46
End: 2021-06-02
Payer: COMMERCIAL

## 2021-06-03 ENCOUNTER — ANESTHESIA (OUTPATIENT)
Dept: SURGERY | Facility: AMBULATORY SURGERY CENTER | Age: 46
End: 2021-06-03
Payer: COMMERCIAL

## 2021-06-03 ENCOUNTER — HOSPITAL ENCOUNTER (OUTPATIENT)
Facility: AMBULATORY SURGERY CENTER | Age: 46
End: 2021-06-03
Attending: ORTHOPAEDIC SURGERY | Admitting: ORTHOPAEDIC SURGERY
Payer: COMMERCIAL

## 2021-06-03 VITALS
TEMPERATURE: 97.1 F | SYSTOLIC BLOOD PRESSURE: 116 MMHG | RESPIRATION RATE: 12 BRPM | DIASTOLIC BLOOD PRESSURE: 73 MMHG | OXYGEN SATURATION: 100 % | HEART RATE: 70 BPM | WEIGHT: 166.01 LBS | BODY MASS INDEX: 26.39 KG/M2

## 2021-06-03 DIAGNOSIS — S83.241D TEAR OF MEDIAL MENISCUS OF RIGHT KNEE, CURRENT, UNSPECIFIED TEAR TYPE, SUBSEQUENT ENCOUNTER: ICD-10-CM

## 2021-06-03 DIAGNOSIS — S83.281D TEAR OF LATERAL MENISCUS OF RIGHT KNEE, CURRENT, UNSPECIFIED TEAR TYPE, SUBSEQUENT ENCOUNTER: ICD-10-CM

## 2021-06-03 DIAGNOSIS — S83.241D TEAR OF MEDIAL MENISCUS OF RIGHT KNEE, CURRENT, UNSPECIFIED TEAR TYPE, SUBSEQUENT ENCOUNTER: Primary | ICD-10-CM

## 2021-06-03 LAB — HCG UR QL: NEGATIVE

## 2021-06-03 PROCEDURE — 29880 ARTHRS KNE SRG MNISECTMY M&L: CPT | Mod: RT

## 2021-06-03 PROCEDURE — G8907 PT DOC NO EVENTS ON DISCHARG: HCPCS

## 2021-06-03 PROCEDURE — G8918 PT W/O PREOP ORDER IV AB PRO: HCPCS

## 2021-06-03 PROCEDURE — 29880 ARTHRS KNE SRG MNISECTMY M&L: CPT | Mod: RT | Performed by: ORTHOPAEDIC SURGERY

## 2021-06-03 PROCEDURE — 81025 URINE PREGNANCY TEST: CPT | Performed by: ANESTHESIOLOGY

## 2021-06-03 RX ORDER — FENTANYL CITRATE 50 UG/ML
25-50 INJECTION, SOLUTION INTRAMUSCULAR; INTRAVENOUS
Status: DISCONTINUED | OUTPATIENT
Start: 2021-06-03 | End: 2021-06-04 | Stop reason: HOSPADM

## 2021-06-03 RX ORDER — PROPOFOL 10 MG/ML
INJECTION, EMULSION INTRAVENOUS CONTINUOUS PRN
Status: DISCONTINUED | OUTPATIENT
Start: 2021-06-03 | End: 2021-06-03

## 2021-06-03 RX ORDER — SODIUM CHLORIDE, SODIUM LACTATE, POTASSIUM CHLORIDE, CALCIUM CHLORIDE 600; 310; 30; 20 MG/100ML; MG/100ML; MG/100ML; MG/100ML
INJECTION, SOLUTION INTRAVENOUS CONTINUOUS
Status: DISCONTINUED | OUTPATIENT
Start: 2021-06-03 | End: 2021-06-04 | Stop reason: HOSPADM

## 2021-06-03 RX ORDER — NALOXONE HYDROCHLORIDE 0.4 MG/ML
0.4 INJECTION, SOLUTION INTRAMUSCULAR; INTRAVENOUS; SUBCUTANEOUS
Status: DISCONTINUED | OUTPATIENT
Start: 2021-06-03 | End: 2021-06-04 | Stop reason: HOSPADM

## 2021-06-03 RX ORDER — DEXAMETHASONE SODIUM PHOSPHATE 4 MG/ML
INJECTION, SOLUTION INTRA-ARTICULAR; INTRALESIONAL; INTRAMUSCULAR; INTRAVENOUS; SOFT TISSUE PRN
Status: DISCONTINUED | OUTPATIENT
Start: 2021-06-03 | End: 2021-06-03

## 2021-06-03 RX ORDER — ONDANSETRON 2 MG/ML
4 INJECTION INTRAMUSCULAR; INTRAVENOUS EVERY 30 MIN PRN
Status: DISCONTINUED | OUTPATIENT
Start: 2021-06-03 | End: 2021-06-04 | Stop reason: HOSPADM

## 2021-06-03 RX ORDER — ONDANSETRON 4 MG/1
4 TABLET, ORALLY DISINTEGRATING ORAL EVERY 30 MIN PRN
Status: DISCONTINUED | OUTPATIENT
Start: 2021-06-03 | End: 2021-06-04 | Stop reason: HOSPADM

## 2021-06-03 RX ORDER — OXYCODONE HYDROCHLORIDE 5 MG/1
5-10 TABLET ORAL EVERY 4 HOURS PRN
Status: DISCONTINUED | OUTPATIENT
Start: 2021-06-03 | End: 2021-06-04 | Stop reason: HOSPADM

## 2021-06-03 RX ORDER — HYDROXYZINE HYDROCHLORIDE 25 MG/1
25 TABLET, FILM COATED ORAL
Status: DISCONTINUED | OUTPATIENT
Start: 2021-06-03 | End: 2021-06-04 | Stop reason: HOSPADM

## 2021-06-03 RX ORDER — METHOCARBAMOL 750 MG/1
750 TABLET, FILM COATED ORAL
Status: DISCONTINUED | OUTPATIENT
Start: 2021-06-03 | End: 2021-06-04 | Stop reason: HOSPADM

## 2021-06-03 RX ORDER — CEFAZOLIN SODIUM 2 G/100ML
2 INJECTION, SOLUTION INTRAVENOUS SEE ADMIN INSTRUCTIONS
Status: DISCONTINUED | OUTPATIENT
Start: 2021-06-03 | End: 2021-06-04 | Stop reason: HOSPADM

## 2021-06-03 RX ORDER — LIDOCAINE HYDROCHLORIDE 20 MG/ML
INJECTION, SOLUTION INFILTRATION; PERINEURAL PRN
Status: DISCONTINUED | OUTPATIENT
Start: 2021-06-03 | End: 2021-06-03

## 2021-06-03 RX ORDER — KETOROLAC TROMETHAMINE 30 MG/ML
INJECTION, SOLUTION INTRAMUSCULAR; INTRAVENOUS PRN
Status: DISCONTINUED | OUTPATIENT
Start: 2021-06-03 | End: 2021-06-03

## 2021-06-03 RX ORDER — DEXAMETHASONE SODIUM PHOSPHATE 4 MG/ML
4 INJECTION, SOLUTION INTRA-ARTICULAR; INTRALESIONAL; INTRAMUSCULAR; INTRAVENOUS; SOFT TISSUE EVERY 10 MIN PRN
Status: DISCONTINUED | OUTPATIENT
Start: 2021-06-03 | End: 2021-06-04 | Stop reason: HOSPADM

## 2021-06-03 RX ORDER — ACETAMINOPHEN 325 MG/1
975 TABLET ORAL ONCE
Status: COMPLETED | OUTPATIENT
Start: 2021-06-03 | End: 2021-06-03

## 2021-06-03 RX ORDER — NALOXONE HYDROCHLORIDE 0.4 MG/ML
0.2 INJECTION, SOLUTION INTRAMUSCULAR; INTRAVENOUS; SUBCUTANEOUS
Status: DISCONTINUED | OUTPATIENT
Start: 2021-06-03 | End: 2021-06-04 | Stop reason: HOSPADM

## 2021-06-03 RX ORDER — HYDROCODONE BITARTRATE AND ACETAMINOPHEN 5; 325 MG/1; MG/1
1-2 TABLET ORAL EVERY 6 HOURS PRN
Qty: 10 TABLET | Refills: 0 | Status: SHIPPED | OUTPATIENT
Start: 2021-06-03 | End: 2021-06-17

## 2021-06-03 RX ORDER — SCOLOPAMINE TRANSDERMAL SYSTEM 1 MG/1
1 PATCH, EXTENDED RELEASE TRANSDERMAL
Status: DISCONTINUED | OUTPATIENT
Start: 2021-06-03 | End: 2021-06-04 | Stop reason: HOSPADM

## 2021-06-03 RX ORDER — FENTANYL CITRATE 50 UG/ML
INJECTION, SOLUTION INTRAMUSCULAR; INTRAVENOUS PRN
Status: DISCONTINUED | OUTPATIENT
Start: 2021-06-03 | End: 2021-06-03

## 2021-06-03 RX ORDER — CEFAZOLIN SODIUM 2 G/100ML
2 INJECTION, SOLUTION INTRAVENOUS
Status: COMPLETED | OUTPATIENT
Start: 2021-06-03 | End: 2021-06-03

## 2021-06-03 RX ORDER — BUPIVACAINE HYDROCHLORIDE 2.5 MG/ML
INJECTION, SOLUTION INFILTRATION; PERINEURAL PRN
Status: DISCONTINUED | OUTPATIENT
Start: 2021-06-03 | End: 2021-06-03 | Stop reason: HOSPADM

## 2021-06-03 RX ORDER — ALBUTEROL SULFATE 0.83 MG/ML
2.5 SOLUTION RESPIRATORY (INHALATION) EVERY 4 HOURS PRN
Status: DISCONTINUED | OUTPATIENT
Start: 2021-06-03 | End: 2021-06-04 | Stop reason: HOSPADM

## 2021-06-03 RX ORDER — ONDANSETRON 4 MG/1
4 TABLET, ORALLY DISINTEGRATING ORAL
Status: DISCONTINUED | OUTPATIENT
Start: 2021-06-03 | End: 2021-06-04 | Stop reason: HOSPADM

## 2021-06-03 RX ORDER — KETOROLAC TROMETHAMINE 30 MG/ML
30 INJECTION, SOLUTION INTRAMUSCULAR; INTRAVENOUS EVERY 6 HOURS PRN
Status: DISCONTINUED | OUTPATIENT
Start: 2021-06-03 | End: 2021-06-04 | Stop reason: HOSPADM

## 2021-06-03 RX ORDER — PROPOFOL 10 MG/ML
INJECTION, EMULSION INTRAVENOUS PRN
Status: DISCONTINUED | OUTPATIENT
Start: 2021-06-03 | End: 2021-06-03

## 2021-06-03 RX ORDER — LIDOCAINE 40 MG/G
CREAM TOPICAL
Status: DISCONTINUED | OUTPATIENT
Start: 2021-06-03 | End: 2021-06-04 | Stop reason: HOSPADM

## 2021-06-03 RX ORDER — ASPIRIN 325 MG
325 TABLET ORAL DAILY
Qty: 14 TABLET | Refills: 0 | Status: SHIPPED | OUTPATIENT
Start: 2021-06-03 | End: 2021-06-17

## 2021-06-03 RX ORDER — LABETALOL HYDROCHLORIDE 5 MG/ML
10 INJECTION, SOLUTION INTRAVENOUS
Status: DISCONTINUED | OUTPATIENT
Start: 2021-06-03 | End: 2021-06-04 | Stop reason: HOSPADM

## 2021-06-03 RX ORDER — ONDANSETRON 2 MG/ML
INJECTION INTRAMUSCULAR; INTRAVENOUS PRN
Status: DISCONTINUED | OUTPATIENT
Start: 2021-06-03 | End: 2021-06-03

## 2021-06-03 RX ORDER — AMOXICILLIN 250 MG
1-2 CAPSULE ORAL 2 TIMES DAILY
Qty: 30 TABLET | Refills: 0 | Status: SHIPPED | OUTPATIENT
Start: 2021-06-03 | End: 2021-06-17

## 2021-06-03 RX ORDER — MEPERIDINE HYDROCHLORIDE 25 MG/ML
12.5 INJECTION INTRAMUSCULAR; INTRAVENOUS; SUBCUTANEOUS
Status: DISCONTINUED | OUTPATIENT
Start: 2021-06-03 | End: 2021-06-04 | Stop reason: HOSPADM

## 2021-06-03 RX ORDER — OXYCODONE HYDROCHLORIDE 5 MG/1
5 TABLET ORAL
Status: COMPLETED | OUTPATIENT
Start: 2021-06-03 | End: 2021-06-03

## 2021-06-03 RX ORDER — KETAMINE HYDROCHLORIDE 10 MG/ML
INJECTION INTRAMUSCULAR; INTRAVENOUS PRN
Status: DISCONTINUED | OUTPATIENT
Start: 2021-06-03 | End: 2021-06-03

## 2021-06-03 RX ADMIN — SCOLOPAMINE TRANSDERMAL SYSTEM 1 PATCH: 1 PATCH, EXTENDED RELEASE TRANSDERMAL at 10:07

## 2021-06-03 RX ADMIN — DEXAMETHASONE SODIUM PHOSPHATE 8 MG: 4 INJECTION, SOLUTION INTRA-ARTICULAR; INTRALESIONAL; INTRAMUSCULAR; INTRAVENOUS; SOFT TISSUE at 10:23

## 2021-06-03 RX ADMIN — PROPOFOL 200 MG: 10 INJECTION, EMULSION INTRAVENOUS at 10:12

## 2021-06-03 RX ADMIN — SODIUM CHLORIDE, SODIUM LACTATE, POTASSIUM CHLORIDE, CALCIUM CHLORIDE: 600; 310; 30; 20 INJECTION, SOLUTION INTRAVENOUS at 09:55

## 2021-06-03 RX ADMIN — KETAMINE HYDROCHLORIDE 20 MG: 10 INJECTION INTRAMUSCULAR; INTRAVENOUS at 10:17

## 2021-06-03 RX ADMIN — LIDOCAINE HYDROCHLORIDE 100 MG: 20 INJECTION, SOLUTION INFILTRATION; PERINEURAL at 10:12

## 2021-06-03 RX ADMIN — OXYCODONE HYDROCHLORIDE 5 MG: 5 TABLET ORAL at 11:41

## 2021-06-03 RX ADMIN — PROPOFOL 100 MCG/KG/MIN: 10 INJECTION, EMULSION INTRAVENOUS at 10:17

## 2021-06-03 RX ADMIN — KETOROLAC TROMETHAMINE 30 MG: 30 INJECTION, SOLUTION INTRAMUSCULAR; INTRAVENOUS at 10:23

## 2021-06-03 RX ADMIN — ONDANSETRON 4 MG: 2 INJECTION INTRAMUSCULAR; INTRAVENOUS at 10:52

## 2021-06-03 RX ADMIN — FENTANYL CITRATE 50 MCG: 50 INJECTION, SOLUTION INTRAMUSCULAR; INTRAVENOUS at 10:06

## 2021-06-03 RX ADMIN — CEFAZOLIN SODIUM 2 G: 2 INJECTION, SOLUTION INTRAVENOUS at 10:07

## 2021-06-03 RX ADMIN — ACETAMINOPHEN 975 MG: 325 TABLET ORAL at 09:51

## 2021-06-03 NOTE — ANESTHESIA PREPROCEDURE EVALUATION
Anesthesia Pre-Procedure Evaluation    Patient: Brittni Massey   MRN: [id number] : 1975        Preoperative Diagnosis: Tear of medial meniscus of right knee, current, unspecified tear type, subsequent encounter [S83.380D]  Tear of lateral meniscus of right knee, current, unspecified tear type, subsequent encounter [S83.281D]   Procedure : Procedure(s):  ARTHROSCOPY, KNEE, right meniscal and chondral debridement     Past Medical History:   Diagnosis Date     H/O colposcopy with cervical biopsy 3/16/15    ECC - LSIL, Bx - NO 1     LSIL (low grade squamous intraepithelial lesion) on Pap smear 2/24/15    + HR HPV     Motion sickness       Past Surgical History:   Procedure Laterality Date     SURGICAL HISTORY OF -       Shaved heel bone right foot.      Allergies   Allergen Reactions     Sulfa Drugs Rash      Social History     Tobacco Use     Smoking status: Never Smoker     Smokeless tobacco: Never Used     Tobacco comment: Nonsmoking household   Substance Use Topics     Alcohol use: Yes     Comment: Very very rarely      Wt Readings from Last 1 Encounters:   21 75.3 kg (166 lb 0.1 oz)        Anesthesia Evaluation   Pt has had prior anesthetic.     History of anesthetic complications  - PONV.      ROS/MED HX  ENT/Pulmonary:  - neg pulmonary ROS     Neurologic:  - neg neurologic ROS     Cardiovascular:  - neg cardiovascular ROS     METS/Exercise Tolerance:     Hematologic:  - neg hematologic  ROS     Musculoskeletal:  - neg musculoskeletal ROS     GI/Hepatic:  - neg GI/hepatic ROS     Renal/Genitourinary:  - neg Renal ROS     Endo:  - neg endo ROS     Psychiatric/Substance Use:  - neg psychiatric ROS     Infectious Disease:  - neg infectious disease ROS     Malignancy:  - neg malignancy ROS     Other:  - neg other ROS          Physical Exam    Airway  airway exam normal      Mallampati: II   TM distance: > 3 FB   Neck ROM: full   Mouth opening: > 3 cm    Respiratory Devices and Support          Dental  no notable dental history         Cardiovascular          Rhythm and rate: regular and normal     Pulmonary   pulmonary exam normal        breath sounds clear to auscultation           OUTSIDE LABS:  CBC:   Lab Results   Component Value Date    WBC 5.9 02/01/2010    HGB 13.4 05/17/2021    HGB 13.5 02/01/2010    HCT 40.0 02/01/2010     02/01/2010     BMP:   Lab Results   Component Value Date     08/03/2011     02/01/2010    POTASSIUM 3.9 08/03/2011    POTASSIUM 4.0 02/01/2010    CHLORIDE 104 08/03/2011    CHLORIDE 102 02/01/2010    CO2 23 08/03/2011    CO2 30 02/01/2010    BUN 13 08/03/2011    BUN 13 02/01/2010    CR 0.64 08/03/2011    CR 0.76 02/01/2010    GLC 94 08/03/2011    GLC 88 02/01/2010     COAGS: No results found for: PTT, INR, FIBR  POC:   Lab Results   Component Value Date    HCG Negative 06/03/2021     HEPATIC:   Lab Results   Component Value Date    ALBUMIN 4.1 02/01/2010    PROTTOTAL 7.5 02/01/2010    ALT 21 02/01/2010    AST 19 02/01/2010    ALKPHOS 64 02/01/2010    BILITOTAL 0.4 02/01/2010     OTHER:   Lab Results   Component Value Date    MERCEDES 9.1 08/03/2011    TSH 1.32 01/18/2017       Anesthesia Plan    ASA Status:  1   NPO Status:  NPO Appropriate    Anesthesia Type: General.     - Airway: LMA   Induction: Intravenous.   Maintenance: TIVA.        Consents    Anesthesia Plan(s) and associated risks, benefits, and realistic alternatives discussed. Questions answered and patient/representative(s) expressed understanding.     - Discussed with:  Patient      - Extended Intubation/Ventilatory Support Discussed: No.      - Patient is DNR/DNI Status: No    Use of blood products discussed: No .     Postoperative Care    Pain management: IV analgesics, Oral pain medications, Multi-modal analgesia.   PONV prophylaxis: Ondansetron (or other 5HT-3), Dexamethasone or Solumedrol, Background Propofol Infusion, Scopolamine patch     Comments:                Edward Valle MD

## 2021-06-03 NOTE — ANESTHESIA POSTPROCEDURE EVALUATION
Patient: Brittni Massey    Procedure(s):  ARTHROSCOPY, KNEE, right medial and lateral  meniscal and chondral debridement    Diagnosis:Tear of medial meniscus of right knee, current, unspecified tear type, subsequent encounter [S83.707D]  Tear of lateral meniscus of right knee, current, unspecified tear type, subsequent encounter [S86.526B]  Diagnosis Additional Information: No value filed.    Anesthesia Type:  General    Note:  Disposition: Outpatient   Postop Pain Control: Uneventful            Sign Out: Well controlled pain   PONV: No   Neuro/Psych: Uneventful            Sign Out: Acceptable/Baseline neuro status   Airway/Respiratory: Uneventful            Sign Out: Acceptable/Baseline resp. status   CV/Hemodynamics: Uneventful            Sign Out: Acceptable CV status   Other NRE: NONE   DID A NON-ROUTINE EVENT OCCUR? No           Last vitals:  Vitals:    06/03/21 1130 06/03/21 1145 06/03/21 1200   BP: 127/69 115/71 116/73   Pulse: 83 70    Resp: 11 13 12   Temp:   36.2  C (97.1  F)   SpO2: 100% 100% 100%       Last vitals prior to Anesthesia Care Transfer:  CRNA VITALS  6/3/2021 1031 - 6/3/2021 1131      6/3/2021             Pulse:  73    SpO2:  99 %    Resp Rate (observed):  (!) 5          Electronically Signed By: Edward Valle MD  Ibeth 3, 2021  3:07 PM

## 2021-06-03 NOTE — BRIEF OP NOTE
POST OPERATIVE NOTE-IMMEDIATE :    Date of surgery: 6/3/2021    Preoperative Diagnosis:  Tear of medial meniscus of right knee, current, unspecified tear type, subsequent encounter [S83.241D]  Tear of lateral meniscus of right knee, current, unspecified tear type, subsequent encounter [S83.281D]    Postoperative Diagnosis:  right knee medial and lateral meniscus tears, medial and patellofemoral compartment osteochondrosis    Procedures:  Procedure(s):  ARTHROSCOPY, KNEE, right medial and lateral  meniscal and chondral debridement    Prosthetic Devices: See Op Note    Surgeon(s) and Assistants (if any):  Attending Surgeon: Ulisses Hyatt MD, MS  Assistant: Phu Ballard PA-C    Anesthesia:  General    Antibiotics: 2g Ancef    IV Fluids: 500cc LR    UOP: 0, no little    Drains: none    Specimens: none    Complications: None apparent.    Tourniquet Time: 21 minutes @ 250mmHg    Findings/Conclusions: grade 4 medial compartment chondrosis. Large complex medial meniscus tear. Radial and free-edge tearing of lateral meniscus. See Op Note for further detail.    Estimated Blood Loss: 2ml    Post Op Plan:  *Rest   *Ice   *Elevation   *Weight bearing as tolerated, crutches as needed   *oral pain medications  *Daily asa x2 weeks  *Home exercise program   *Return to clinic 2 weeks for wound check, suture removal, sooner if needed      Phu Ballard PA-C, CAQ (Ortho)  Supervising Physician: Ulisses Hyatt M.D., M.S.  Dept. of Orthopaedic Surgery  [facility]

## 2021-06-03 NOTE — INTERVAL H&P NOTE
"I have reviewed the surgical (or preoperative) H&P that is linked to this encounter, and examined the patient. There are no significant changes    The History and Physical on patient's chart was personally reviewed today with the patient. there have been no interval changes in patient's history since H+P performed.    History:  Brittni Massey is a 46 year old female with  ongoing right knee pain with no known injury. Was seen for the same 1/25/2021 and received an injection, which worked well for quite a while, but pain returned. Was able to resume normal activities, exercise, biking without pain. Now pain is back. Locates pain along the inner aspect of the knee again.     Pain has been present since ~11/2020. Onset of pain after starting an exercise regimen at home, just came on, has been ever since. Locates pain along the inner aspect of the knee and behind the knee. Pain is aggravated with activity, walking. Has pain at rest, worse at night. Has noticed some swelling but that is improved. Feels the knee \"locks\" all the time.     Denies prior knee problems.     On her feet 8 hours/day as a nurse.    X-RAY: 3 views right knee from 1/25/2021 - no obvious fractures or dislocations. Mild medial compartment narrowing.     MRI:  MRI right knee from 1/19/2021    1. Increased intrasubstance signal of the posterior horn of the  lateral meniscus consistent with meniscal degeneration, additional  horizontal oblique component with a subtle communication with the  tibial articular cartilage that could represent a subtle meniscal  tear. No displaced fragments off flaps  2. Free edge fraying of the posterior horn of the medial meniscus with  associated mild extrusion.  3. 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 Modified Outerbridge grade II chondromalacia of the  lateral compartment. Modified Outerbridge grade II chondromalacia of  the patellofemoral joint.  4. Moderate joint effusion.      "    ASSESSMENT/PLAN: Brittni Massey is a 46 year old female with chronic right knee pain, chondromalacia, possible medial meniscus and lateral meniscus tears.      * discussed injury with patient, what appears to be possible medial and lateral  meniscal tear on MRI, as well as some underlying arthritic changes, which is consistent with symptoms and physical examination findings.      * Discussed treatment options including nonoperative treatment with continued rest, ice, elevation, activity modification, NSAIDS and Physical Therapy, bracing and potential injections versus surgical treatment with arthroscopy and meniscal repair versus debridement, possible chondral debridement. Risks and benefits of each discussed in detail.  * in the setting of underlying chondrosis, predictability of arthroscopy is uncertain, unless mechanical symptoms present due to the meniscus tear.     * surgical risks discussed: bleeding, infection, pain, scar, damage to adjacent structures (nerve, vessels, cartilage), stiffness, post-traumatic arthritis, failure to relieve symptoms, recurrence of symptoms, blood clots (DVT), pulmonary emolism, risks of anesthesia and death. This surgery is not intended nor expected to alleviate arthritic pain symptoms, nor will it treat or correct underlying arthritic changes. Arthritis and symptoms related to arthritis could worsen with arthroscopy and meniscal and/or chondral debridement. Patient understands.     * understanding the risks of surgery, patient elected to proceed with surgery.  * plan right knee arthroscopy, meniscal debridement versus repair, possible chondral debridement, outpatient      Risks and perceived benefits of surgery again discussed with patient. Patient's questions addressed and answered. Written informed consent obtained and reviewed. Surgical site marked with indelible marker with patient's participation after confirming site with patient.      Ulisses Hyatt M.D.,  M.S.  Dept. of Orthopaedic Surgery  [facility]

## 2021-06-03 NOTE — ANESTHESIA CARE TRANSFER NOTE
Patient: Brittni Massey    Procedure(s):  ARTHROSCOPY, KNEE, right medial and lateral  meniscal and chondral debridement    Diagnosis: Tear of medial meniscus of right knee, current, unspecified tear type, subsequent encounter [S83.241D]  Tear of lateral meniscus of right knee, current, unspecified tear type, subsequent encounter [S83.281D]  Diagnosis Additional Information: No value filed.    Anesthesia Type:   General     Note:    Oropharynx: oropharynx clear of all foreign objects  Level of Consciousness: drowsy  Oxygen Supplementation: face mask    Independent Airway: airway patency satisfactory and stable  Dentition: dentition unchanged      Patient transferred to: PACU    Handoff Report: Identifed the Patient, Identified the Reponsible Provider, Reviewed the pertinent medical history, Discussed the surgical course, Reviewed Intra-OP anesthesia mangement and issues during anesthesia, Set expectations for post-procedure period and Allowed opportunity for questions and acknowledgement of understanding      Vitals: (Last set prior to Anesthesia Care Transfer)  CRNA VITALS  6/3/2021 1031 - 6/3/2021 1108      6/3/2021             Pulse:  73    SpO2:  99 %    Resp Rate (observed):  (!) 5        Electronically Signed By: SLICK Ding CRNA  Ibeth 3, 2021  11:08 AM

## 2021-06-03 NOTE — OP NOTE
Procedure Date: 06/03/2021    POSTOPERATIVE DIAGNOSES:  Chronic right knee pain, medial and lateral meniscus tears, medial and patellofemoral chondrosis.    POSTOPERATIVE DIAGNOSES:  Chronic right knee pain, medial and lateral meniscus tears, medial and patellofemoral chondrosis, multiple articular cartilage loose bodies, anterior cruciate ligament cyst.    PROCEDURES:    1.  Right knee arthroscopic partial lateral meniscectomy.  2.  Right knee arthroscopic partial medial meniscectomy.  3.  Right knee arthroscopic loose body removal.  4.  Right knee arthroscopic medial plica resection.  5.  Right knee shaving chondroplasty of the medial tibia, medial femoral condyle, femoral trochlea, patella.  6.  Right knee arthroscopic ACL cyst debridement.    SURGEON:  Ulisses Hyatt MD.    ASSISTANT:  Phu Ballard PA-C.    ANESTHESIA:  General in the supine position.    INTRAVENOUS FLUIDS:  500 mL Lactated Ringer's.    URINE OUTPUT:  No Fregoso.    ESTIMATED BLOOD LOSS:  200 mL    DESCRIPTION OF PROCEDURE:  Cefazolin 2 grams IV prior to incision and tourniquet inflation.      TOURNIQUET TIME:  21 minutes at 250 mmHg, right upper thigh.    DRAINS:  None.    SPECIMENS:  None.    IMPLANTS:  None.    COMPLICATIONS:  None apparent.    FINDINGS:  Mild suprapatellar synovitis.  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  Small medial plica.  Grade 3 fissure of the trochlea.  Grade 3 and 4 chondrosis of the anterior aspect of the medial femoral condyle, diffuse grade 3 chondrosis of the entire weightbearing portion of the medial femoral condyle.  No loose bodies in the medial or lateral gutters.  Intact ACL within the notch.  Lateral compartment with a complex tear of the posterior horn body with undersurface horizontal cleavage tear of the posterior horn into the posterior body with a small radial tear at the mid body.  Grade 1 and 2 chondrosis laterally.  Medial compartment with complex tear of the posterior horn body with displaced  posterior horn body junction flap and a posterior flap with undersurface delamination.  Grade 4 chondrosis of the far medial aspect of the tibial plateau with loose bodies.  Small cyst at the base of the ACL.    INDICATIONS FOR PROCEDURE:  Brittni Massey is a 46-year-old female with ongoing right knee pain without specific injury.  She has had pain for less than a year, onset after starting an exercise regimen at home.  It just came on and has continued since then.  Most pain along the inner aspect of the knee, behind the knee and in the front of the knee.  Aggravated with activity such as walking and exercise.  Pain at rest, worse at night.  She has had some swelling, but that seems to fluctuate.  Feels like the knee locks at times as well.  She was seen in primary care and referred for an MRI, which did show medial and lateral meniscus tears, as well as some medial and patellofemoral chondrosis and an effusion.  At that time, she elected to proceed with aspiration and injection and actually did quite well for about 2-1/2 to 3 months, but the pain returned.  At that time, we discussed treatment options, continued nonsurgical versus surgical management with arthroscopy and debridement.  I did discuss that given the amount of arthritis in the knee that might not take away all of her pain could make her pain worse.  Understanding these risks, she elected to proceed with surgery.    DESCRIPTION OF PROCEDURE:  The patient was identified in the preoperative holding area.  After confirmed with the patient the correct procedure and procedure site, the right knee was marked with a marking pen by myself.  After again reviewing the risks and perceived benefits of surgery, questions were addressed.  The patient elected to proceed.  Written informed consent was obtained and reviewed.    The patient was then taken to the operating room and placed supine on the operating table.  All bony prominences were well padded.  She was  adequately secured.  After adequate general anesthesia, a nonsterile tourniquet was placed on the right upper thigh.  Right lower extremity was prepped and draped in the usual sterile fashion.    A time-out was then performed, confirming the correct patient, procedure, procedure, site, availability of instruments and implants, and review of the patient's allergies, as well as administration of prophylactic antibiotics per operating staff.    At that time, right lower extremity was elevated, exsanguinated with an Esmarch and tourniquet inflated.  Standard anterolateral arthroscopy portal was made.  Upon entering suprapatellar pouch, there was no obvious effusion, mild-to-moderate synovitis.  There was a small medial plica.  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  No loose body in the medial and lateral gutters.  Upon entering the notch, the ACL was intact.  There was a cyst at the base of the ACL.  Anteromedial arthroscopy portal was made and guided with a spinal needle.  Probe was introduced and then indeed, there was a cyst at the base of the ACL.  Using the oscillating debrider, this was debrided back.    Then, the knee was placed in a figure-of-four position, evaluating the lateral compartment.  There was undersurface horizontal tearing of the posterior horn into the body, with a small radial tear more of the mid to anterior body.  Alternating between the oscillating debrider a meniscal biter, this was debrided back until this was stable, confirmed with probing.    Back up to the suprapatellar pouch, the medial plica was resected.  Then to the medial compartment using gentle valgus stress.  Complex tearing of the posterior horn and body with displaced flaps posteriorly and in the body, as well as undersurface degeneration.  Alternating between an oscillating debrider meniscal biter, this was debrided back until this was stable, confirmed with probing.  There was approximately 1 x 1.5 cm of bare bone wear on the  far medial aspect of the medial femoral condyle with loose bodies in this spot.  These loose bodies were lavaged out.  Then, using the oscillating debrider, a shaving chondroplasty of the tibial shoulder, followed by the medial femoral condyle, which was significant wear throughout the entire condyle, was debrided back.    With the knee in extension, there was grade 3 and 4 chondrosis at the anterior aspect of the medial femoral condyle and using the oscillating debrider, this was debrided back until this was stable, confirmed with probing.  Some wear under the patella and the femoral trochlea was also debrided back.    At that time, remaining fluid was removed from the knee, and instruments were removed.  Wounds were closed with 3-0 Prolene, injected with 0.25% Marcaine.  Steri-Strips, a well-padded soft dressing, Ace wrap and tourniquet inflated.  She was then awakened and taken to recovery in stable condition.     Postop to rest, ice, and elevate.  Weight bear as tolerated.  Home exercise program.  Oral pain medications will include hydrocodone versus over-the-counter.  Stool softener daily, aspirin for 2 weeks for blood thinning.  We will see her back in 2 weeks' time, sooner if needed.    No apparent complications.    Ulisses Hyatt MD        D: 2021   T: 2021   MT: RAUL    Name:     TIFFANY MCKEON  MRN:      [id number]        Account:        [id number]   :      1975           Procedure Date: 2021     Document: [id number]

## 2021-06-03 NOTE — DISCHARGE INSTRUCTIONS
1. Name: Brittni Massey MRN #: [id number]  2. Date: 6/3/2021  Procedure: right knee arthroscopy, meniscal and chondral debridement  3. Discharge to home when stable, tolerating clear liquids, and patient has urinated  4. Call for follow-up appointment, [phone], with Dr. Hyatt in:  2 weeks   WOUND CARE    The bandage may be slightly bloody. This is normal.  5. Ice:  Keep an ice bag on your knee for 20 minutes at a time.  6. Keep incisions clean and dry following surgery for:  72 hours   7. Change all bandages in:  72 hours       8. If bandages are changed before follow-up, cover all incisions with fresh bandages or bandaids.  9. O.K. to shower (may get incision wet) in:  72 hours  10. No tub baths, swimming pools, hot tubs, etc. for a minimum of 2 weeks following surgery  ACTIVITY  11. Keep leg elevated on a pillow placed under ankle. Do not keep pillow under your knee.  12. Weight-bearing ([place]):  Weight-bear as tolerated       May discontinue crutches in 2-3 days if able to walk without a limp.  13. Bracing: no brace needed  14. Range of motion limits: no range of motion limit. Work to regain full knee range of motion.  15. Exercises:  Perform exercises 3 times a day for a minimum of 25 reps each time (start today or tomorrow):             Quadriceps sets  Calf Pumps Straight leg raises  Heels Slides   16. Start Physical Therapy: will discuss upon return to clinic.  17. OK to drive:  Not until cleared by Dr Hyatt    When going back to driving, be sure to test braking/acceleration maneuvers in an empty parking lot before entry into any traffic areas.      ABSOLUTELY NO DRIVING WHILE TAKING NARCOTICS!    DISCHARGE MEDICATIONS:   Aspirin 325 mg, 1 tablet, take once a day for 14 days then stop (to prevent blood clots) (over the counter)  Norco (5/325), 1 to 2 tablets, take every 6 hours as needed for pain  Other: stool softeners while on pain medications  Ok to take over the counter pain medications such  as ibuprofen or acetaminophen as needed instead of prescription pain medications.    Strong pain medication has been prescribed. Use as directed. Do not combine with alcohol. Be careful as you walk or climb stairs.   DIET:  If no nausea, clear liquids should be taken initially.  Then progress to solid foods when clear liquids are tolerated.   RESPONSE TO SURGERY: It is normal to have pain and swelling in your knee after surgery. It may take 4 weeks or longer for the swelling to go away. It is also common to notice some bruising around the knee, thigh, and calf as the swelling resolves.  EMERGENCY: Call or return for any fevers (temperature greater than 101.5   or sustained fevers greater than 100.5   that haven t resolved within 3 to 4 days following surgery) or chills, increasing pain, swelling, redness, calf pain, drainage (especially if yellow, green, or foul smelling), excessive bleeding), chest pain, shortness of breath:  Phone #: [phone]; If emergency, go to local ER or dial 911.    Ulisses Hyatt M.D., M.S.  Dept. of Orthopaedic Surgery  [facility]    6/3/2021        KNEE SURGERY - HOME EXERCISE PROGRAM    All exercises to be performed at least 3 times per day.     Quad Sets    Sit with leg extended    Tighten quad muscles in front of leg, trying to  push back of knee downward    Hold exercise for 10 seconds    Rest 10 seconds between reps    Perform 1 set of 20 reps, 3 times a day     Heel Slides     Lie on back with legs straight    Slide heel to buttocks     Return to start position    Repeat with other leg    Perform 1 rep every 4 seconds    Perform 3 sets of 20 reps, 3 times a day    Rest 1 minute between sets     Ankle Pumps     Lie on back with foot elevated on pillow    Move foot up and down, pumping ankle    Perform 3 sets of 20 reps, 3 times a day    Perform 1 rep every 4 seconds    Rest 1 minute between sets     Straight Leg Raise    Lie on back with uninvolved knee  bent    Raise straight leg to thigh level of bend leg    Return to starting position    Perform 3 sets of 20 reps, 3 times a day    Perform 1 rep every 4 seconds    Rest 1 minute between sets            SCOPALAMINE Patch placed behind right ear for nausea relief.  Remove the patch in 24-26 hours.  Dispose in trash and wash hands carefully.     Information for Patients Discharging with a Transderm Scopolamine Patch       Dry mouth is a common side effect.    Drowsiness is another common side effect especially when combined with pain medication.  Please avoid activities that require mental alertness such as driving a car or making important legal decisions.    Since Scopolamine can cause temporary dilation of the pupils and blurred vision if it comes in contact with the eyes; be sure to wash your hands thoroughly with soap and water immediately after handling the patch.   When you remove your patch, please stick it to a tissue or paper towel for disposal.      Remove the patch immediately and contact a physician in the unlikely event that you experience symptoms of acute glaucoma (pain and reddening of the eyes, accompanied by dilated pupils).  Remove the patch if you develop any difficulties urinating.  If you cannot urinate after removing your patch, please notify your surgeon.    [facility]   Adult Discharge Orders & Instructions     For 24 hours after surgery    1. Get plenty of rest.  A responsible adult must stay with you for at least 24 hours after you leave the hospital.   2. Do not drive or use heavy equipment.  If you have weakness or tingling, don't drive or use heavy equipment until this feeling goes away.  3. Do not drink alcohol.  4. Avoid strenuous or risky activities.  Ask for help when climbing stairs.   5. You may feel lightheaded.  IF so, sit for a few minutes before standing.  Have someone help you get up.   6. If you have nausea (feel sick to your stomach): Drink only clear liquids  such as apple juice, ginger ale, broth or 7-Up.  Rest may also help.  Be sure to drink enough fluids.  Move to a regular diet as you feel able.  7. You may have a slight fever. Call the doctor if your fever is over 100 F (37.7 C) (taken under the tongue) or lasts longer than 24 hours.  8. You may have a dry mouth, a sore throat, muscle aches or trouble sleeping.  These should go away after 24 hours.  9. Do not make important or legal decisions.     Call your doctor for any of the followin.  Signs of infection (fever, growing tenderness at the surgery site, a large amount of drainage or bleeding, severe pain, foul-smelling drainage, redness, swelling).    2. It has been over 8 to 10 hours since surgery and you are still not able to urinate (pass water).    3.  Headache for over 24 hours.    4.  Numbness, tingling or weakness the day after surgery (if you had spinal anesthesia).                  5. Signs of Covid-19 infection (temperature over 100 degrees, shortness of breath, cough, loss of taste/smell, generalized body aches, persistent headache,                  chills, sore throat, nausea/vomiting/diarrhea).

## 2021-06-17 ENCOUNTER — OFFICE VISIT (OUTPATIENT)
Dept: ORTHOPEDICS | Facility: CLINIC | Age: 46
End: 2021-06-17
Payer: COMMERCIAL

## 2021-06-17 VITALS
WEIGHT: 167 LBS | HEIGHT: 67 IN | SYSTOLIC BLOOD PRESSURE: 117 MMHG | HEART RATE: 103 BPM | DIASTOLIC BLOOD PRESSURE: 58 MMHG | BODY MASS INDEX: 26.21 KG/M2

## 2021-06-17 DIAGNOSIS — Z98.890 S/P ARTHROSCOPY OF RIGHT KNEE: Primary | ICD-10-CM

## 2021-06-17 PROCEDURE — 99024 POSTOP FOLLOW-UP VISIT: CPT | Performed by: ORTHOPAEDIC SURGERY

## 2021-06-17 ASSESSMENT — MIFFLIN-ST. JEOR: SCORE: 1427.2

## 2021-06-17 ASSESSMENT — PAIN SCALES - GENERAL: PAINLEVEL: MILD PAIN (2)

## 2021-06-17 NOTE — LETTER
6/17/2021         RE: Brittni Massey  [address] [address] [facility] [id number]        Dear Colleague,    Thank you for referring your patient, Brittni Massey, to the [facility]. Please see a copy of my visit note below.    Chief Complaint   Patient presents with     Right Knee - Surgical Followup     Arthroscopy - partial L&M menisectomy, loose body removal, MPR, chondroplasty of MT, TFC, FT, P and ACL reconstruction cyst debridement. DOS 6/3/21, 2 wk s/p. Patient notes her knee is getting better, every day is better.      Surgical Followup     Her ROM is not real good yet but thinks it is normal.        SURGERY:  ([facility] )  1.  Right knee arthroscopic partial lateral meniscectomy.  2.  Right knee arthroscopic partial medial meniscectomy.  3.  Right knee arthroscopic loose body removal.  4.  Right knee arthroscopic medial plica resection.  5.  Right knee shaving chondroplasty of the medial tibia, medial femoral condyle, femoral trochlea, patella.  6.  Right knee arthroscopic ACL cyst debridement.  DATE OF SURGERY: 6/3/2021.      HISTORY OF PRESENT ILLNESS:  Brittni Massey is a 46 year old female seen for postoperative evaluation of a right knee arthroscopy and medial and lateral meniscal debridement for medial meniscus and lateral meniscus tears. Surgery occurred 2 weeks ago. Returns today stating overall doing well, improving daily. Her range of motion is lagging but states that is normal for her. Pain has been iproving. No problems with the surgical wounds. Denies fevers chills or night sweats. No associated numbness or tingling. Has been doing home exercise program since surgery as recommended. Taking aspirin daily.    Pain 2/10.    OR FINDINGS:  Mild suprapatellar synovitis.  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  Small medial plica.  Grade 3 fissure of the trochlea.  Grade 3 and 4 chondrosis of the anterior aspect of  the medial femoral condyle, diffuse grade 3 chondrosis of the entire weightbearing portion of the medial femoral condyle.  No loose bodies in the medial or lateral gutters.  Intact ACL within the notch.  Lateral compartment with a complex tear of the posterior horn body with undersurface horizontal cleavage tear of the posterior horn into the posterior body with a small radial tear at the mid body.  Grade 1 and 2 chondrosis laterally.  Medial compartment with complex tear of the posterior horn body with displaced posterior horn body junction flap and a posterior flap with undersurface delamination.  Grade 4 chondrosis of the far medial aspect of the tibial plateau with loose bodies.  Small cyst at the base of the ACL.    Past Medical History:   Diagnosis Date     H/O colposcopy with cervical biopsy 3/16/15    ECC - LSIL, Bx - NO 1     LSIL (low grade squamous intraepithelial lesion) on Pap smear 2/24/15    + HR HPV     Motion sickness        Past Surgical History:   Procedure Laterality Date     ARTHROSCOPY KNEE Right 6/3/2021    Procedure: ARTHROSCOPY, KNEE, right medial and lateral  meniscal and chondral debridement;  Surgeon: Ulisses Hyatt MD;  Location: [facility] OR     SURGICAL HISTORY OF -   1990    Shaved heel bone right foot.       Medications:   Current Outpatient Medications:      aspirin (ASA) 325 MG tablet, Take 1 tablet (325 mg) by mouth daily for 14 days, Disp: 14 tablet, Rfl: 0     HYDROcodone-acetaminophen (NORCO) 5-325 MG tablet, Take 1-2 tablets by mouth every 6 hours as needed for moderate to severe pain, Disp: 10 tablet, Rfl: 0     senna-docusate (SENOKOT-S/PERICOLACE) 8.6-50 MG tablet, Take 1-2 tablets by mouth 2 times daily, Disp: 30 tablet, Rfl: 0    Allergies:   Allergies   Allergen Reactions     Sulfa Drugs Rash       REVIEW OF SYSTEMS:   CONSTITUTIONAL:NEGATIVE for fever, chills, night sweats  INTEGUMENTARY/SKIN: NEGATIVE for worrisome wound problems or redness.  MUSCULOSKELETAL:See HPI  "above  NEURO: NEGATIVE for weakness, dizziness or paresthesias    PHYSICAL EXAM:  /58   Pulse 103   Ht 1.689 m (5' 6.5\")   Wt 75.8 kg (167 lb)   BMI 26.55 kg/m     GENERAL APPEARANCE: healthy, alert, no distress  SKIN: no suspicious lesions or rashes  NEURO: Normal strength and tone, mentation intact and speech normal  PSYCH:  mentation appears normal and affect normal, not anxious  RESPIRATORY: No increased work of breathing.    right  LOWER EXTREMITY:  Gait: quadriceps avoidance right.  No Quad atrophy, strength weal  Intact sensation deep peroneal nerve, superficial peroneal nerve, med/lat tibial nerve, sural nerve, saphenous nerve  Intact EHL, EDL, TA, FHL, GS, quadriceps hamstrings and hip flexors  Toes warm and well perfused, brisk capillary refill. Palpable 2+ dp pulses.  calf soft and nttp or squeeze.  Edema: none    right  KNEE EXAM:    Skin: intact, mild diffuse but mostly medial ecchymosis, no erythema  Incisions: skin edges well approximated, sutures in place. Dry. No erythema.  ROM: full extension to 90 flexion, limited by discomfort  Effusion: small  Tender: mild diffuse, mostly medial and incisional.      X-RAY: none indicated.      Impression: Brittni Massey is a 46 year old female 2 weeks status post right knee arthroscopy and medial and lateral meniscus debridement, doing well.       Plan: routine postoperative knee arthroscopy  * suture removal    Surgical images reviewed with patient today.    * WB status: weight bearing as tolerated . Cautioned not to overdo it too quickly. Increased activities too soon will lead to more swelling of the knee and leg, more pain, slower recovery. Expect 6-8 weeks.    * Rest  * Activity modification - avoid activities that aggravate symptoms.  * NSAIDS - regular use for inflammation, with food, as long as no contra-indications. Please discuss with pcp if needed.  * Ice twice daily to three times daily as needed.  * Compression wrap as needed.  * " Elevation of extremity to reduce swelling as needed.  * PT ordered for strengthening, stretching and range of motion exercises, effusion control.  * Tylenol as needed for pain  * return to clinic 4 weeks, sooner as needed.      * We did also discuss that based on the amount of arthritic changes seen at the time of surgery, may have continued knee pain due to the arthritis. Once recovered from the knee arthroscopy, and arthritic symptoms persist, consider full treatment of arthritis starting with Physical Therapy and injections, NSAIDS, activity modification, bracing.      Ulisses Hyatt M.D., M.S.  Dept. of Orthopaedic Surgery  [facility]      Again, thank you for allowing me to participate in the care of your patient.        Sincerely,        Ulisses Hyatt MD

## 2021-06-17 NOTE — PROGRESS NOTES
Chief Complaint   Patient presents with     Right Knee - Surgical Followup     Arthroscopy - partial L&M menisectomy, loose body removal, MPR, chondroplasty of MT, TFC, FT, P and ACL reconstruction cyst debridement. DOS 6/3/21, 2 wk s/p. Patient notes her knee is getting better, every day is better.      Surgical Followup     Her ROM is not real good yet but thinks it is normal.        SURGERY:  ([facility] )  1.  Right knee arthroscopic partial lateral meniscectomy.  2.  Right knee arthroscopic partial medial meniscectomy.  3.  Right knee arthroscopic loose body removal.  4.  Right knee arthroscopic medial plica resection.  5.  Right knee shaving chondroplasty of the medial tibia, medial femoral condyle, femoral trochlea, patella.  6.  Right knee arthroscopic ACL cyst debridement.  DATE OF SURGERY: 6/3/2021.      HISTORY OF PRESENT ILLNESS:  Brittni Massey is a 46 year old female seen for postoperative evaluation of a right knee arthroscopy and medial and lateral meniscal debridement for medial meniscus and lateral meniscus tears. Surgery occurred 2 weeks ago. Returns today stating overall doing well, improving daily. Her range of motion is lagging but states that is normal for her. Pain has been iproving. No problems with the surgical wounds. Denies fevers chills or night sweats. No associated numbness or tingling. Has been doing home exercise program since surgery as recommended. Taking aspirin daily.    Pain 2/10.    OR FINDINGS:  Mild suprapatellar synovitis.  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  Small medial plica.  Grade 3 fissure of the trochlea.  Grade 3 and 4 chondrosis of the anterior aspect of the medial femoral condyle, diffuse grade 3 chondrosis of the entire weightbearing portion of the medial femoral condyle.  No loose bodies in the medial or lateral gutters.  Intact ACL within the notch.  Lateral compartment with a complex tear of the posterior horn body  "with undersurface horizontal cleavage tear of the posterior horn into the posterior body with a small radial tear at the mid body.  Grade 1 and 2 chondrosis laterally.  Medial compartment with complex tear of the posterior horn body with displaced posterior horn body junction flap and a posterior flap with undersurface delamination.  Grade 4 chondrosis of the far medial aspect of the tibial plateau with loose bodies.  Small cyst at the base of the ACL.    Past Medical History:   Diagnosis Date     H/O colposcopy with cervical biopsy 3/16/15    ECC - LSIL, Bx - NO 1     LSIL (low grade squamous intraepithelial lesion) on Pap smear 2/24/15    + HR HPV     Motion sickness        Past Surgical History:   Procedure Laterality Date     ARTHROSCOPY KNEE Right 6/3/2021    Procedure: ARTHROSCOPY, KNEE, right medial and lateral  meniscal and chondral debridement;  Surgeon: Ulisses Hyatt MD;  Location: [facility] OR     SURGICAL HISTORY OF -   1990    Shaved heel bone right foot.       Medications:   Current Outpatient Medications:      aspirin (ASA) 325 MG tablet, Take 1 tablet (325 mg) by mouth daily for 14 days, Disp: 14 tablet, Rfl: 0     HYDROcodone-acetaminophen (NORCO) 5-325 MG tablet, Take 1-2 tablets by mouth every 6 hours as needed for moderate to severe pain, Disp: 10 tablet, Rfl: 0     senna-docusate (SENOKOT-S/PERICOLACE) 8.6-50 MG tablet, Take 1-2 tablets by mouth 2 times daily, Disp: 30 tablet, Rfl: 0    Allergies:   Allergies   Allergen Reactions     Sulfa Drugs Rash       REVIEW OF SYSTEMS:   CONSTITUTIONAL:NEGATIVE for fever, chills, night sweats  INTEGUMENTARY/SKIN: NEGATIVE for worrisome wound problems or redness.  MUSCULOSKELETAL:See HPI above  NEURO: NEGATIVE for weakness, dizziness or paresthesias    PHYSICAL EXAM:  /58   Pulse 103   Ht 1.689 m (5' 6.5\")   Wt 75.8 kg (167 lb)   BMI 26.55 kg/m     GENERAL APPEARANCE: healthy, alert, no distress  SKIN: no suspicious lesions or rashes  NEURO: Normal " strength and tone, mentation intact and speech normal  PSYCH:  mentation appears normal and affect normal, not anxious  RESPIRATORY: No increased work of breathing.    right  LOWER EXTREMITY:  Gait: quadriceps avoidance right.  No Quad atrophy, strength weal  Intact sensation deep peroneal nerve, superficial peroneal nerve, med/lat tibial nerve, sural nerve, saphenous nerve  Intact EHL, EDL, TA, FHL, GS, quadriceps hamstrings and hip flexors  Toes warm and well perfused, brisk capillary refill. Palpable 2+ dp pulses.  calf soft and nttp or squeeze.  Edema: none    right  KNEE EXAM:    Skin: intact, mild diffuse but mostly medial ecchymosis, no erythema  Incisions: skin edges well approximated, sutures in place. Dry. No erythema.  ROM: full extension to 90 flexion, limited by discomfort  Effusion: small  Tender: mild diffuse, mostly medial and incisional.      X-RAY: none indicated.      Impression: Brittni Massey is a 46 year old female 2 weeks status post right knee arthroscopy and medial and lateral meniscus debridement, doing well.       Plan: routine postoperative knee arthroscopy  * suture removal    Surgical images reviewed with patient today.    * WB status: weight bearing as tolerated . Cautioned not to overdo it too quickly. Increased activities too soon will lead to more swelling of the knee and leg, more pain, slower recovery. Expect 6-8 weeks.    * Rest  * Activity modification - avoid activities that aggravate symptoms.  * NSAIDS - regular use for inflammation, with food, as long as no contra-indications. Please discuss with pcp if needed.  * Ice twice daily to three times daily as needed.  * Compression wrap as needed.  * Elevation of extremity to reduce swelling as needed.  * PT ordered for strengthening, stretching and range of motion exercises, effusion control.  * Tylenol as needed for pain  * return to clinic 4 weeks, sooner as needed.      * We did also discuss that based on the amount of  arthritic changes seen at the time of surgery, may have continued knee pain due to the arthritis. Once recovered from the knee arthroscopy, and arthritic symptoms persist, consider full treatment of arthritis starting with Physical Therapy and injections, NSAIDS, activity modification, bracing.      Ulisses Hyatt M.D., M.S.  Dept. of Orthopaedic Surgery  [facility]

## 2021-06-28 ENCOUNTER — THERAPY VISIT (OUTPATIENT)
Dept: PHYSICAL THERAPY | Facility: CLINIC | Age: 46
End: 2021-06-28
Attending: PHYSICIAN ASSISTANT
Payer: COMMERCIAL

## 2021-06-28 DIAGNOSIS — M25.561 ACUTE PAIN OF RIGHT KNEE: ICD-10-CM

## 2021-06-28 DIAGNOSIS — R60.0 LOCALIZED EDEMA: ICD-10-CM

## 2021-06-28 DIAGNOSIS — Z98.890 S/P ARTHROSCOPY OF RIGHT KNEE: ICD-10-CM

## 2021-06-28 PROCEDURE — 97161 PT EVAL LOW COMPLEX 20 MIN: CPT | Mod: GP | Performed by: PHYSICAL THERAPIST

## 2021-06-28 PROCEDURE — 97016 VASOPNEUMATIC DEVICE THERAPY: CPT | Mod: GP | Performed by: PHYSICAL THERAPIST

## 2021-06-28 PROCEDURE — 97110 THERAPEUTIC EXERCISES: CPT | Mod: GP | Performed by: PHYSICAL THERAPIST

## 2021-06-28 ASSESSMENT — ACTIVITIES OF DAILY LIVING (ADL)
SIT WITH YOUR KNEE BENT: ACTIVITY IS FAIRLY DIFFICULT
LIMPING: THE SYMPTOM AFFECTS MY ACTIVITY MODERATELY
WALK: ACTIVITY IS SOMEWHAT DIFFICULT
SQUAT: I AM UNABLE TO DO THE ACTIVITY
PAIN: THE SYMPTOM AFFECTS MY ACTIVITY MODERATELY
HOW_WOULD_YOU_RATE_THE_CURRENT_FUNCTION_OF_YOUR_KNEE_DURING_YOUR_USUAL_DAILY_ACTIVITIES_ON_A_SCALE_FROM_0_TO_100_WITH_100_BEING_YOUR_LEVEL_OF_KNEE_FUNCTION_PRIOR_TO_YOUR_INJURY_AND_0_BEING_THE_INABILITY_TO_PERFORM_ANY_OF_YOUR_USUAL_DAILY_ACTIVITIES?: 50
GO DOWN STAIRS: ACTIVITY IS FAIRLY DIFFICULT
GO UP STAIRS: ACTIVITY IS SOMEWHAT DIFFICULT
AS_A_RESULT_OF_YOUR_KNEE_INJURY,_HOW_WOULD_YOU_RATE_YOUR_CURRENT_LEVEL_OF_DAILY_ACTIVITY?: ABNORMAL
KNEE_ACTIVITY_OF_DAILY_LIVING_SCORE: 45.71
KNEE_ACTIVITY_OF_DAILY_LIVING_SUM: 32
GIVING WAY, BUCKLING OR SHIFTING OF KNEE: I DO NOT HAVE THE SYMPTOM
WEAKNESS: THE SYMPTOM AFFECTS MY ACTIVITY SLIGHTLY
RISE FROM A CHAIR: ACTIVITY IS SOMEWHAT DIFFICULT
KNEEL ON THE FRONT OF YOUR KNEE: I AM UNABLE TO DO THE ACTIVITY
SWELLING: THE SYMPTOM AFFECTS MY ACTIVITY MODERATELY
STAND: ACTIVITY IS SOMEWHAT DIFFICULT
HOW_WOULD_YOU_RATE_THE_OVERALL_FUNCTION_OF_YOUR_KNEE_DURING_YOUR_USUAL_DAILY_ACTIVITIES?: ABNORMAL
RAW_SCORE: 32
STIFFNESS: THE SYMPTOM AFFECTS MY ACTIVITY MODERATELY

## 2021-06-28 NOTE — PROGRESS NOTES
[facility] Physical Therapy Initial Evaluation  6/28/2021     Precautions/Restrictions/MD instructions: Evaluate and treat for R knee pain s/p R knee arthroscopy on 6/3/2021    Therapist Assessment: Brittni Massey is a 46 year old female patient presenting to Physical Therapy with R knee pain s/p arthroscopy (medial and lateral partial menisectomies, chondroplasty, plica resection, loose body removal, and ACL cyst debridement) on 6/3/2021. Patient demonstrates notable R knee effusion, decreased quadriceps strength, and decreased R knee flexion. Signs and symptoms are consistent with post-operative status. These impairments limit their ability to stand longer periods of time, walk, ascend/descend stairs, and perform normal work duties. Skilled PT services are necessary in order to reduce impairments and improve independent function.    Subjective:     Injury/Condition Details:  Presenting Complaint R knee pain   Onset Timing/Date Problematic since November 2020; DOS 6/3/2021   Mechanism Pt reports she started having knee symptoms in late 2020. She went through formal PT and received an injection, which was helpful. However, she continued to have R knee pain so she had an arthroscopy on 6/3/2021. Pt reports her knee has continued to be very bothersome since surgery.     Symptom Behavior Details    Primary Symptoms Sporadic symptoms; Activity/position dependent, pain (Location: inside of R knee, Quality: Burning), stiffness, catching/locking, weakness, swelling   Worst Pain 10/10   Symptom Provocators Stairs, standing for longer periods of time, walking, squatting   Best Pain 0/10    Symptom Relievers Ice, rest/elevation   Time of day dependent? No   Recent symptom change? symptoms worsening     Prior Testing/Intervention for current condition:  Prior Tests  x-ray and MRI in January 2021:  X-ray - IMPRESSION: Mild medial compartment joint space narrowing. Small joint effusion. Otherwise  unremarkable.     ALIYA MCKEON MD    MRI - Impression:  1. Increased intrasubstance signal of the posterior horn of the lateral meniscus consistent with meniscal degeneration, additional horizontal oblique component with a subtle communication with the tibial articular cartilage that could represent a subtle meniscal tear. No displaced fragments off flaps.  2. Free edge fraying of the posterior horn of the medial meniscus with associated mild extrusion.  3. 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 Modified Outerbridge grade II chondromalacia of the lateral compartment. Modified Outerbridge grade II chondromalacia of the patellofemoral joint.  4. Moderate joint effusion.     I have personally reviewed the examination and initial interpretation and I agree with the findings.     JUTTA ELLERMANN, MD     Prior Treatment PT , Injections: cortisone (very helpful) and Surgery(ies): as noted above     Lifestyle & General Medical History:  Employment Nurse   Usual physical activities  (within past year) Walking/hiking, biking, lifting/carrying, prolonged standing, repetitive tasks   Orthopaedic History  None other than previous R knee issues as noted above   Medication  None   Notable medical history See Epic Chart   Patient goals Return to riding bike (avg. - 5-6 miles per ride) and walking (avg. - 2 miles per walk)   Patient Reported Health excellent     Red Flags: (Bold when present) - reviewed the following and denies  Calf pain/swelling/warmth, malaise, unexplained weight loss, night pain, fever    Objective:    Posture: in seated position, pt does not bend R knee as much as L    Functional:   - DL squat: good form and alignement, but only ~25% depth prior to increased knee pain    SL Balance:   R: 30 sec; observations: proper ankle strategy  L: 23 sec; observations: proper ankle strategy    Gait: decreased stance time on and weight shift to R LE    Palpation: TTP of R medial joint line  and near incisions    Other:  - Swellin+ R knee effusion with sweep test    KNEE: (* indicates patient's primary complaint)   PROM R PROM L AROM R AROM L MMT R MMT L   Ext   5-0 5-0 4* 5   Flx   90* 140 4+ 5     Quadriceps Muscle Activation Right Left   Isometric Quad Activation Good Normal   Straight Leg Raising No extensor lag No extensor lag       ASSESSMENT/PLAN  Patient is a 46 year old female with R knee complaints.    Patient has the following significant findings with corresponding treatment plan.                Diagnosis 1:  R knee pain s/p arthroscopy on 6/3/2021    Pain -  hot/cold therapy, US, electric stimulation, manual therapy, splint/taping/bracing/orthotics, self management, education and home program  Decreased ROM/flexibility - manual therapy, therapeutic exercise and home program  Decreased joint mobility - manual therapy, therapeutic exercise and home program  Decreased strength - therapeutic exercise, therapeutic activities and home program  Inflammation - cold therapy, US, electric stimulation and self management/home program  Edema - vasopneumatics, electric stimulation, cold therapy, cryocuff and self management/home program  Impaired gait - gait training and home program  Decreased function - therapeutic activities and home program    Therapy Evaluation Codes:   1) History comprised of:   Personal factors that impact the plan of care:      Profession.    Comorbidity factors that impact the plan of care are:      None.     Medications impacting care: None.  2) Examination of Body Systems comprised of:   Body structures and functions that impact the plan of care:      Knee.   Activity limitations that impact the plan of care are:      Cooking, Driving, Dressing, Lifting, Sitting, Sports, Squatting/kneeling, Stairs, Standing, Walking and Working.  3) Clinical presentation characteristics are:   Stable/Uncomplicated.  4) Decision-Making    Low complexity using standardized patient  assessment instrument and/or measureable assessment of functional outcome.  Cumulative Therapy Evaluation is: Low complexity.    Previous and current functional limitations:  (See Goal Flow Sheet for this information)    Short term and Long term goals: (See Goal Flow Sheet for this information)     Communication ability:  Patient appears to be able to clearly communicate and understand verbal and written communication and follow directions correctly.  Treatment Explanation - The following has been discussed with the patient:   RX ordered/plan of care  Anticipated outcomes  Possible risks and side effects  This patient would benefit from PT intervention to resume normal activities.   Rehab potential is excellent.    Frequency:  1 X week, once daily  Duration:  for 4 weeks; tapering to 2x/month for 2 months  Discharge Plan:  Achieve all LTG.  Independent in home treatment program.  Reach maximal therapeutic benefit.    Please refer to the daily flowsheet for treatment today, total treatment time and time spent performing 1:1 timed codes.

## 2021-07-05 ENCOUNTER — THERAPY VISIT (OUTPATIENT)
Dept: PHYSICAL THERAPY | Facility: CLINIC | Age: 46
End: 2021-07-05
Payer: COMMERCIAL

## 2021-07-05 DIAGNOSIS — M25.561 ACUTE PAIN OF RIGHT KNEE: ICD-10-CM

## 2021-07-05 DIAGNOSIS — R60.0 LOCALIZED EDEMA: ICD-10-CM

## 2021-07-05 DIAGNOSIS — Z98.890 S/P ARTHROSCOPY OF RIGHT KNEE: ICD-10-CM

## 2021-07-05 PROCEDURE — 97110 THERAPEUTIC EXERCISES: CPT | Mod: GP | Performed by: PHYSICAL THERAPIST

## 2021-07-05 PROCEDURE — 97140 MANUAL THERAPY 1/> REGIONS: CPT | Mod: GP | Performed by: PHYSICAL THERAPIST

## 2021-07-05 PROCEDURE — 97016 VASOPNEUMATIC DEVICE THERAPY: CPT | Mod: GP | Performed by: PHYSICAL THERAPIST

## 2021-07-12 ENCOUNTER — TELEPHONE (OUTPATIENT)
Dept: ORTHOPEDICS | Facility: CLINIC | Age: 46
End: 2021-07-12

## 2021-07-12 ENCOUNTER — THERAPY VISIT (OUTPATIENT)
Dept: PHYSICAL THERAPY | Facility: CLINIC | Age: 46
End: 2021-07-12
Payer: COMMERCIAL

## 2021-07-12 DIAGNOSIS — R60.0 LOCALIZED EDEMA: ICD-10-CM

## 2021-07-12 DIAGNOSIS — M25.561 ACUTE PAIN OF RIGHT KNEE: ICD-10-CM

## 2021-07-12 DIAGNOSIS — Z98.890 S/P ARTHROSCOPY OF RIGHT KNEE: ICD-10-CM

## 2021-07-12 PROCEDURE — 97110 THERAPEUTIC EXERCISES: CPT | Mod: GP | Performed by: PHYSICAL THERAPIST

## 2021-07-12 PROCEDURE — 97140 MANUAL THERAPY 1/> REGIONS: CPT | Mod: GP | Performed by: PHYSICAL THERAPIST

## 2021-07-12 PROCEDURE — 97016 VASOPNEUMATIC DEVICE THERAPY: CPT | Mod: GP | Performed by: PHYSICAL THERAPIST

## 2021-07-12 NOTE — PROGRESS NOTES
"Subjective:  HPI  Physical Exam                    Objective:  System    Physical Exam    General     ROS    Assessment/Plan:    SUBJECTIVE  Subjective: \"It's getting better but it's not great.\"  Still has more pain than she'd like to have.  Going up the stairs has gotten easier but going down remains difficult.  Has been on partial duty at work but returns to full duty this week.   Current Pain level:  (not rated numerically)   Changes in function:  Yes (See Goal flowsheet attached for changes in current functional level)     Adverse reaction to treatment or activity:  None    OBJECTIVE  Objective: Pt ambulates without device, mildly antalgic pattern favoring R.  AROM 39.3 cm R, L 37.8 cm.  AROM 1-0-93 upon arrival.  Following session, AROM 101 flexion, joint line circumference 38.6 cm R.     ASSESSMENT  Brittni continues to require intervention to meet STG and LTG's: PT  Patient is progressing as expected.  Response to therapy has shown an improvement in  ROM with manual work in clinic.  Progress made towards STG/LTG?  Yes (See Goal flowsheet attached for updates on achievement of STG and LTG)    PLAN  Current treatment program is being advanced to more complex exercises.  Continue focus toward reducing effusion, increasing flexion, and advancing strength.    PTA/ATC plan:  N/A    Please refer to the daily flowsheet for treatment today, total treatment time and time spent performing 1:1 timed codes.        "

## 2021-07-12 NOTE — TELEPHONE ENCOUNTER
Patient is getting a denial from insurance stating her procedure in July was not approved. Please call her.

## 2021-07-12 NOTE — TELEPHONE ENCOUNTER
Called and spoke to patient letting her know that she would need to contact the [employer]. The phone number should be on her bill. She said she has called but has to wait on hold for a long time. I explained we in clinic, don't do anything with billing. She understood.   Hansa Langley Certified Medical Assistant

## 2021-07-19 ENCOUNTER — OFFICE VISIT (OUTPATIENT)
Dept: ORTHOPEDICS | Facility: CLINIC | Age: 46
End: 2021-07-19
Payer: COMMERCIAL

## 2021-07-19 VITALS
DIASTOLIC BLOOD PRESSURE: 85 MMHG | WEIGHT: 167 LBS | HEIGHT: 67 IN | BODY MASS INDEX: 26.21 KG/M2 | SYSTOLIC BLOOD PRESSURE: 125 MMHG

## 2021-07-19 DIAGNOSIS — M25.461 EFFUSION OF RIGHT KNEE: ICD-10-CM

## 2021-07-19 DIAGNOSIS — Z98.890 S/P ARTHROSCOPY OF RIGHT KNEE: ICD-10-CM

## 2021-07-19 DIAGNOSIS — M17.11 PRIMARY OSTEOARTHRITIS OF RIGHT KNEE: Primary | ICD-10-CM

## 2021-07-19 PROCEDURE — 99024 POSTOP FOLLOW-UP VISIT: CPT | Performed by: ORTHOPAEDIC SURGERY

## 2021-07-19 RX ORDER — METHYLPREDNISOLONE ACETATE 80 MG/ML
80 INJECTION, SUSPENSION INTRA-ARTICULAR; INTRALESIONAL; INTRAMUSCULAR; SOFT TISSUE
Status: DISCONTINUED | OUTPATIENT
Start: 2021-07-19 | End: 2022-04-07

## 2021-07-19 RX ORDER — BUPIVACAINE HYDROCHLORIDE 2.5 MG/ML
4 INJECTION, SOLUTION INFILTRATION; PERINEURAL
Status: DISCONTINUED | OUTPATIENT
Start: 2021-07-19 | End: 2022-04-07

## 2021-07-19 RX ADMIN — BUPIVACAINE HYDROCHLORIDE 4 ML: 2.5 INJECTION, SOLUTION INFILTRATION; PERINEURAL at 10:09

## 2021-07-19 RX ADMIN — METHYLPREDNISOLONE ACETATE 80 MG: 80 INJECTION, SUSPENSION INTRA-ARTICULAR; INTRALESIONAL; INTRAMUSCULAR; SOFT TISSUE at 10:09

## 2021-07-19 ASSESSMENT — MIFFLIN-ST. JEOR: SCORE: 1427.2

## 2021-07-19 ASSESSMENT — PAIN SCALES - GENERAL: PAINLEVEL: MILD PAIN (2)

## 2021-07-19 NOTE — PROGRESS NOTES
Chief Complaint   Patient presents with     Right Knee - Surgical Followup     Right knee arthroscopy on 6/3/21. Went back to work last week. It was a bit rough. She's on her feet all day. Pain is medial, gets worse over the course of the day.       SURGERY:  ([facility] )  1.  Right knee arthroscopic partial lateral meniscectomy.  2.  Right knee arthroscopic partial medial meniscectomy.  3.  Right knee arthroscopic loose body removal.  4.  Right knee arthroscopic medial plica resection.  5.  Right knee shaving chondroplasty of the medial tibia, medial femoral condyle, femoral trochlea, patella.  6.  Right knee arthroscopic ACL cyst debridement.  DATE OF SURGERY: 6/3/2021.      HISTORY OF PRESENT ILLNESS:  Brittni Massey is a 46 year old female seen for postoperative evaluation of a right knee arthroscopy and medial and lateral meniscal debridement for medial meniscus and lateral meniscus tears. Surgery occurred 6.5 weeks ago. Returns today stating having more medial knee pain, returned to work last week and on her feet all day. Pain worse as the day goes on. Going to Physical Therapy.    Pain 2/10.    OR FINDINGS:  Mild suprapatellar synovitis.  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  Small medial plica.  Grade 3 fissure of the trochlea.  Grade 3 and 4 chondrosis of the anterior aspect of the medial femoral condyle, diffuse grade 3 chondrosis of the entire weightbearing portion of the medial femoral condyle.  No loose bodies in the medial or lateral gutters.  Intact ACL within the notch.  Lateral compartment with a complex tear of the posterior horn body with undersurface horizontal cleavage tear of the posterior horn into the posterior body with a small radial tear at the mid body.  Grade 1 and 2 chondrosis laterally.  Medial compartment with complex tear of the posterior horn body with displaced posterior horn body junction flap and a posterior flap with undersurface  "delamination.  Grade 4 chondrosis of the far medial aspect of the tibial plateau with loose bodies.  Small cyst at the base of the ACL.    Past Medical History:   Diagnosis Date     H/O colposcopy with cervical biopsy 3/16/15    ECC - LSIL, Bx - NO 1     LSIL (low grade squamous intraepithelial lesion) on Pap smear 2/24/15    + HR HPV     Motion sickness        Past Surgical History:   Procedure Laterality Date     ARTHROSCOPY KNEE Right 6/3/2021    Procedure: ARTHROSCOPY, KNEE, right medial and lateral  meniscal and chondral debridement;  Surgeon: Ulisses Hyatt MD;  Location: [facility] OR     SURGICAL HISTORY OF -   1990    Shaved heel bone right foot.       Medications: No current outpatient medications on file.    Allergies:   Allergies   Allergen Reactions     Sulfa Drugs Rash       REVIEW OF SYSTEMS:   CONSTITUTIONAL:NEGATIVE for fever, chills, night sweats  INTEGUMENTARY/SKIN: NEGATIVE for worrisome wound problems or redness.  MUSCULOSKELETAL:See HPI above  NEURO: NEGATIVE for weakness, dizziness or paresthesias    PHYSICAL EXAM:  /85   Ht 1.689 m (5' 6.5\")   Wt 75.8 kg (167 lb)   BMI 26.55 kg/m     GENERAL APPEARANCE: healthy, alert, no distress  SKIN: no suspicious lesions or rashes  NEURO: Normal strength and tone, mentation intact and speech normal  PSYCH:  mentation appears normal and affect normal, not anxious  RESPIRATORY: No increased work of breathing.    right  LOWER EXTREMITY:  Gait: slight quadriceps avoidance right.  No Quad atrophy, strength weak  Intact sensation deep peroneal nerve, superficial peroneal nerve, med/lat tibial nerve, sural nerve, saphenous nerve  Intact EHL, EDL, TA, FHL, GS, quadriceps hamstrings and hip flexors  Toes warm and well perfused, brisk capillary refill. Palpable 2+ dp pulses.  calf soft and nttp or squeeze.  Edema: none    right  KNEE EXAM:    Skin: intact, no ecchymosis, no erythema  Incisions: well healed. Dry. No erythema.  ROM: full extension to 100 " flexion, limited by discomfort  Effusion: small  Tender: mild diffuse, mostly medial       X-RAY: none indicated.      Impression: Brittni Massey is a 46 year old female 6.5 weeks status post right knee arthroscopy and medial and lateral meniscus debridement, small effusion, pain, decreased range of motion, primary osteoarthritis .      Plan: routine postoperative knee arthroscopy    * WB status: weight bearing as tolerated .   * likely some swelling/effusion and the noted knee osteoarthritis to bare bone source of pain. Discussed trial of right knee aspiration/cortisone injection today. She elects to proceed. See procedure note below.    * Rest  * Activity modification - avoid activities that aggravate symptoms.  * NSAIDS - regular use for inflammation, with food, as long as no contra-indications. Please discuss with pcp if needed.  * Ice twice daily to three times daily as needed.  * Compression wrap as needed.  * Elevation of extremity to reduce swelling as needed.  * PT and home exercise program for strengthening, stretching and range of motion exercises, effusion control.  * Tylenol as needed for pain  * workability note.  * return to clinic 4 weeks, sooner as needed.      * We did also discuss that based on the amount of arthritic changes seen at the time of surgery, may have continued knee pain due to the arthritis. Once recovered from the knee arthroscopy, and arthritic symptoms persist, consider full treatment of arthritis starting with Physical Therapy and injections, NSAIDS, activity modification, bracing.      Ulisses Hyatt M.D., M.S.  Dept. of Orthopaedic Surgery  [facility]    Large Joint Injection/Arthocentesis: R knee joint    Date/Time: 7/19/2021 10:09 AM  Performed by: Phu Ballard PA  Authorized by: Ulisses Hyatt MD     Indications:  Joint swelling, osteoarthritis and pain  Needle Size:  18 G  Guidance: landmark guided    Approach:  Superolateral  Location:   Knee      Medications:  80 mg methylPREDNISolone 80 MG/ML; 4 mL bupivacaine 0.25 %  Aspirate amount (mL):  20  Aspirate:  Yellow  Outcome:  Tolerated well, no immediate complications  Procedure discussed: discussed risks, benefits, and alternatives    Consent Given by:  Patient  Prep: patient was prepped and draped in usual sterile fashion

## 2021-07-19 NOTE — LETTER
July 19, 2021      Brittni Massey  [address] [address] [id number]        To Whom It May Concern,     Brittni Massey attended clinic here on Jul 19, 2021. She can return to work with no restrictions except 8 hour max/per day. Starting 8/16/21 she can work with no restrictions. If you have questions or concerns, please call the clinic at the number listed above.    Sincerely,         Phu Ballard PA-C, CAQ-OS  Dept. of Orthopedic Surgery  [facility] [place]

## 2021-07-19 NOTE — LETTER
7/19/2021         RE: Brittni Massey  [address] [address] [id number]        Dear Colleague,    Thank you for referring your patient, Brittni Massey, to the [facility]. Please see a copy of my visit note below.    Chief Complaint   Patient presents with     Right Knee - Surgical Followup     Right knee arthroscopy on 6/3/21. Went back to work last week. It was a bit rough. She's on her feet all day. Pain is medial, gets worse over the course of the day.       SURGERY:  ([facility] [place] )  1.  Right knee arthroscopic partial lateral meniscectomy.  2.  Right knee arthroscopic partial medial meniscectomy.  3.  Right knee arthroscopic loose body removal.  4.  Right knee arthroscopic medial plica resection.  5.  Right knee shaving chondroplasty of the medial tibia, medial femoral condyle, femoral trochlea, patella.  6.  Right knee arthroscopic ACL cyst debridement.  DATE OF SURGERY: 6/3/2021.      HISTORY OF PRESENT ILLNESS:  Brittni Massey is a 46 year old female seen for postoperative evaluation of a right knee arthroscopy and medial and lateral meniscal debridement for medial meniscus and lateral meniscus tears. Surgery occurred 6.5 weeks ago. Returns today stating having more medial knee pain, returned to work last week and on her feet all day. Pain worse as the day goes on. Going to Physical Therapy.    Pain 2/10.    OR FINDINGS:  Mild suprapatellar synovitis.  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  Small medial plica.  Grade 3 fissure of the trochlea.  Grade 3 and 4 chondrosis of the anterior aspect of the medial femoral condyle, diffuse grade 3 chondrosis of the entire weightbearing portion of the medial femoral condyle.  No loose bodies in the medial or lateral gutters.  Intact ACL within the notch.  Lateral compartment with a complex tear of the posterior horn body with undersurface horizontal cleavage tear of the  "posterior horn into the posterior body with a small radial tear at the mid body.  Grade 1 and 2 chondrosis laterally.  Medial compartment with complex tear of the posterior horn body with displaced posterior horn body junction flap and a posterior flap with undersurface delamination.  Grade 4 chondrosis of the far medial aspect of the tibial plateau with loose bodies.  Small cyst at the base of the ACL.    Past Medical History:   Diagnosis Date     H/O colposcopy with cervical biopsy 3/16/15    ECC - LSIL, Bx - NO 1     LSIL (low grade squamous intraepithelial lesion) on Pap smear 2/24/15    + HR HPV     Motion sickness        Past Surgical History:   Procedure Laterality Date     ARTHROSCOPY KNEE Right 6/3/2021    Procedure: ARTHROSCOPY, KNEE, right medial and lateral  meniscal and chondral debridement;  Surgeon: Ulisses Hyatt MD;  Location: [facility] OR     SURGICAL HISTORY OF -   1990    Shaved heel bone right foot.       Medications: No current outpatient medications on file.    Allergies:   Allergies   Allergen Reactions     Sulfa Drugs Rash       REVIEW OF SYSTEMS:   CONSTITUTIONAL:NEGATIVE for fever, chills, night sweats  INTEGUMENTARY/SKIN: NEGATIVE for worrisome wound problems or redness.  MUSCULOSKELETAL:See HPI above  NEURO: NEGATIVE for weakness, dizziness or paresthesias    PHYSICAL EXAM:  /85   Ht 1.689 m (5' 6.5\")   Wt 75.8 kg (167 lb)   BMI 26.55 kg/m     GENERAL APPEARANCE: healthy, alert, no distress  SKIN: no suspicious lesions or rashes  NEURO: Normal strength and tone, mentation intact and speech normal  PSYCH:  mentation appears normal and affect normal, not anxious  RESPIRATORY: No increased work of breathing.    right  LOWER EXTREMITY:  Gait: slight quadriceps avoidance right.  No Quad atrophy, strength weak  Intact sensation deep peroneal nerve, superficial peroneal nerve, med/lat tibial nerve, sural nerve, saphenous nerve  Intact EHL, EDL, TA, FHL, GS, quadriceps hamstrings and hip " flexors  Toes warm and well perfused, brisk capillary refill. Palpable 2+ dp pulses.  calf soft and nttp or squeeze.  Edema: none    right  KNEE EXAM:    Skin: intact, no ecchymosis, no erythema  Incisions: well healed. Dry. No erythema.  ROM: full extension to 100 flexion, limited by discomfort  Effusion: small  Tender: mild diffuse, mostly medial       X-RAY: none indicated.      Impression: Brittni Massey is a 46 year old female 6.5 weeks status post right knee arthroscopy and medial and lateral meniscus debridement, small effusion, pain, decreased range of motion, primary osteoarthritis .      Plan: routine postoperative knee arthroscopy    * WB status: weight bearing as tolerated .   * likely some swelling/effusion and the noted knee osteoarthritis to bare bone source of pain. Discussed trial of right knee aspiration/cortisone injection today. She elects to proceed. See procedure note below.    * Rest  * Activity modification - avoid activities that aggravate symptoms.  * NSAIDS - regular use for inflammation, with food, as long as no contra-indications. Please discuss with pcp if needed.  * Ice twice daily to three times daily as needed.  * Compression wrap as needed.  * Elevation of extremity to reduce swelling as needed.  * PT and home exercise program for strengthening, stretching and range of motion exercises, effusion control.  * Tylenol as needed for pain  * workability note.  * return to clinic 4 weeks, sooner as needed.      * We did also discuss that based on the amount of arthritic changes seen at the time of surgery, may have continued knee pain due to the arthritis. Once recovered from the knee arthroscopy, and arthritic symptoms persist, consider full treatment of arthritis starting with Physical Therapy and injections, NSAIDS, activity modification, bracing.      Ulisses Hyatt M.D., M.S.  Dept. of Orthopaedic Surgery  [facility]    Large Joint Injection/Arthocentesis: R knee  joint    Date/Time: 7/19/2021 10:09 AM  Performed by: Phu Ballard PA  Authorized by: Ulisses Hyatt MD     Indications:  Joint swelling, osteoarthritis and pain  Needle Size:  18 G  Guidance: landmark guided    Approach:  Superolateral  Location:  Knee      Medications:  80 mg methylPREDNISolone 80 MG/ML; 4 mL bupivacaine 0.25 %  Aspirate amount (mL):  20  Aspirate:  Yellow  Outcome:  Tolerated well, no immediate complications  Procedure discussed: discussed risks, benefits, and alternatives    Consent Given by:  Patient  Prep: patient was prepped and draped in usual sterile fashion              Again, thank you for allowing me to participate in the care of your patient.        Sincerely,        Ulisses Hyatt MD

## 2021-07-26 ENCOUNTER — THERAPY VISIT (OUTPATIENT)
Dept: PHYSICAL THERAPY | Facility: CLINIC | Age: 46
End: 2021-07-26
Payer: COMMERCIAL

## 2021-07-26 DIAGNOSIS — M25.561 ACUTE PAIN OF RIGHT KNEE: ICD-10-CM

## 2021-07-26 DIAGNOSIS — Z98.890 S/P ARTHROSCOPY OF RIGHT KNEE: ICD-10-CM

## 2021-07-26 DIAGNOSIS — R60.0 LOCALIZED EDEMA: ICD-10-CM

## 2021-07-26 PROCEDURE — 97140 MANUAL THERAPY 1/> REGIONS: CPT | Mod: GP

## 2021-07-26 PROCEDURE — 97016 VASOPNEUMATIC DEVICE THERAPY: CPT | Mod: GP

## 2021-07-26 PROCEDURE — 97110 THERAPEUTIC EXERCISES: CPT | Mod: GP

## 2021-08-02 ENCOUNTER — THERAPY VISIT (OUTPATIENT)
Dept: PHYSICAL THERAPY | Facility: CLINIC | Age: 46
End: 2021-08-02
Payer: COMMERCIAL

## 2021-08-02 DIAGNOSIS — M25.561 ACUTE PAIN OF RIGHT KNEE: ICD-10-CM

## 2021-08-02 DIAGNOSIS — Z98.890 S/P ARTHROSCOPY OF RIGHT KNEE: ICD-10-CM

## 2021-08-02 DIAGNOSIS — R60.0 LOCALIZED EDEMA: ICD-10-CM

## 2021-08-02 PROCEDURE — 97110 THERAPEUTIC EXERCISES: CPT | Mod: GP

## 2021-08-02 PROCEDURE — 97140 MANUAL THERAPY 1/> REGIONS: CPT | Mod: GP

## 2021-08-09 ENCOUNTER — THERAPY VISIT (OUTPATIENT)
Dept: PHYSICAL THERAPY | Facility: CLINIC | Age: 46
End: 2021-08-09
Payer: COMMERCIAL

## 2021-08-09 DIAGNOSIS — Z98.890 S/P ARTHROSCOPY OF RIGHT KNEE: ICD-10-CM

## 2021-08-09 DIAGNOSIS — M25.561 ACUTE PAIN OF RIGHT KNEE: ICD-10-CM

## 2021-08-09 DIAGNOSIS — R60.0 LOCALIZED EDEMA: ICD-10-CM

## 2021-08-09 PROCEDURE — 97110 THERAPEUTIC EXERCISES: CPT | Mod: GP | Performed by: PHYSICAL THERAPIST

## 2021-08-09 PROCEDURE — 97140 MANUAL THERAPY 1/> REGIONS: CPT | Mod: GP | Performed by: PHYSICAL THERAPIST

## 2021-08-22 NOTE — PROGRESS NOTES
Chief Complaint   Patient presents with     Right Knee - Surgical Followup     Knee(s) Surgery Followup     Pt is s/p R knee arthroscoy 06/03/21. She is currently in PT 1x week. c/o swelling daily. Pain scale is at a 0       SURGERY:  ([facility] [place] )  1.  Right knee arthroscopic partial lateral meniscectomy.  2.  Right knee arthroscopic partial medial meniscectomy.  3.  Right knee arthroscopic loose body removal.  4.  Right knee arthroscopic medial plica resection.  5.  Right knee shaving chondroplasty of the medial tibia, medial femoral condyle, femoral trochlea, patella.  6.  Right knee arthroscopic ACL cyst debridement.  DATE OF SURGERY: 6/3/2021.      HISTORY OF PRESENT ILLNESS:  Brittni Massey is a 46 year old female seen for postoperative evaluation of a right knee arthroscopy and medial and lateral meniscal debridement for medial meniscus and lateral meniscus tears. Surgery occurred 11.5 weeks ago. Had aspiration/injection at last visit on 7/19/2021, still going to Physical Therapy. Returns today stating improving, still has some swelling. Doesn't really have any pain. Swelling worse as the day goes on. On feet all day at work, so notices it more. Wears compression sleeve during the day.    She's not sure the injection made much of a difference, but felt the aspiration of fluid did.    She has been working 8h shifts, but on 8/17 that was up and was made to do a double that day, 17h, and was tough. She thinks that is still too much for her.    Pain 0/10.      OR FINDINGS:  Mild suprapatellar synovitis.  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  Small medial plica.  Grade 3 fissure of the trochlea.  Grade 3 and 4 chondrosis of the anterior aspect of the medial femoral condyle, diffuse grade 3 chondrosis of the entire weightbearing portion of the medial femoral condyle.  No loose bodies in the medial or lateral gutters.  Intact ACL within the notch.  Lateral compartment with  "a complex tear of the posterior horn body with undersurface horizontal cleavage tear of the posterior horn into the posterior body with a small radial tear at the mid body.  Grade 1 and 2 chondrosis laterally.  Medial compartment with complex tear of the posterior horn body with displaced posterior horn body junction flap and a posterior flap with undersurface delamination.  Grade 4 chondrosis of the far medial aspect of the tibial plateau with loose bodies.  Small cyst at the base of the ACL.    Past Medical History:   Diagnosis Date     H/O colposcopy with cervical biopsy 3/16/15    ECC - LSIL, Bx - NO 1     LSIL (low grade squamous intraepithelial lesion) on Pap smear 2/24/15    + HR HPV     Motion sickness        Past Surgical History:   Procedure Laterality Date     ARTHROSCOPY KNEE Right 6/3/2021    Procedure: ARTHROSCOPY, KNEE, right medial and lateral  meniscal and chondral debridement;  Surgeon: Ulisses Hyatt MD;  Location: [facility] OR     SURGICAL HISTORY OF -   1990    Shaved heel bone right foot.       Medications: No current outpatient medications on file.    Current Facility-Administered Medications:      bupivacaine (MARCAINE) 0.25 % injection 4 mL, 4 mL, , , Ulisses Hyatt MD, 4 mL at 07/19/21 1009     methylPREDNISolone (DEPO-MEDROL) injection 80 mg, 80 mg, , , Ulisses Hyatt MD, 80 mg at 07/19/21 1009    Allergies:   Allergies   Allergen Reactions     Sulfa Drugs Rash       REVIEW OF SYSTEMS:   CONSTITUTIONAL:NEGATIVE for fever, chills, night sweats  INTEGUMENTARY/SKIN: NEGATIVE for worrisome wound problems or redness.  MUSCULOSKELETAL:See HPI above  NEURO: NEGATIVE for weakness, dizziness or paresthesias    PHYSICAL EXAM:  /80   Pulse 84   Ht 1.689 m (5' 6.5\")   Wt 75.8 kg (167 lb)   SpO2 98%   BMI 26.55 kg/m     GENERAL APPEARANCE: healthy, alert, no distress  SKIN: no suspicious lesions or rashes  NEURO: Normal strength and tone, mentation intact and speech normal  PSYCH:  " mentation appears normal and affect normal, not anxious  RESPIRATORY: No increased work of breathing.    right  LOWER EXTREMITY:  Gait: slight quadriceps avoidance right.  No Quad atrophy, strength weak  Intact sensation deep peroneal nerve, superficial peroneal nerve, med/lat tibial nerve, sural nerve, saphenous nerve  Intact EHL, EDL, TA, FHL, GS, quadriceps hamstrings and hip flexors  Toes warm and well perfused, brisk capillary refill. Palpable 2+ dp pulses.  calf soft and nttp or squeeze.  Edema: none    right  KNEE EXAM:    Skin: intact, no ecchymosis, no erythema  Incisions: well healed. Dry. No erythema.  ROM: full extension to 120 flexion, limited by anterior tightness.  Effusion: small  Tender: mild diffuse, mostly medial       X-RAY: none indicated.      Impression: Brittni Massey is a 46 year old female 11.5 weeks status post right knee arthroscopy and medial and lateral meniscus debridement, small effusion, pain, decreased range of motion, primary osteoarthritis .      Plan: routine postoperative knee arthroscopy  * discomfort/pressure in knee with bending likely related to swelling, worse at end of day from activities. This should gradually continue to improve.  * WB status: weight bearing as tolerated .   * Rest  * Activity modification - avoid activities that aggravate symptoms.  * NSAIDS - regular use for inflammation, with food, as long as no contra-indications. Please discuss with pcp if needed.  * Ice twice daily to three times daily as needed.  * Compression wrap as needed.  * Elevation of extremity to reduce swelling as needed.  * PT and home exercise program for strengthening, stretching and range of motion exercises, effusion control.  * Tylenol as needed for pain  * workability note. 8h/day x4 more weeks.  * return to clinic as needed.  * consider repeat aspiration/injection with either a different cortisone or hyaluronic acid, as needed.      * We did also discuss that based on the  amount of arthritic changes seen at the time of surgery, may have continued knee pain due to the arthritis. Once recovered from the knee arthroscopy, and arthritic symptoms persist, consider full treatment of arthritis starting with Physical Therapy and injections, NSAIDS, activity modification, bracing.      Ulisses Hyatt M.D., M.S.  Dept. of Orthopaedic Surgery  [facility]

## 2021-08-23 ENCOUNTER — THERAPY VISIT (OUTPATIENT)
Dept: PHYSICAL THERAPY | Facility: CLINIC | Age: 46
End: 2021-08-23
Payer: COMMERCIAL

## 2021-08-23 ENCOUNTER — OFFICE VISIT (OUTPATIENT)
Dept: ORTHOPEDICS | Facility: CLINIC | Age: 46
End: 2021-08-23
Payer: COMMERCIAL

## 2021-08-23 VITALS
WEIGHT: 167 LBS | HEIGHT: 67 IN | BODY MASS INDEX: 26.21 KG/M2 | HEART RATE: 84 BPM | SYSTOLIC BLOOD PRESSURE: 118 MMHG | DIASTOLIC BLOOD PRESSURE: 80 MMHG | OXYGEN SATURATION: 98 %

## 2021-08-23 DIAGNOSIS — R60.0 LOCALIZED EDEMA: ICD-10-CM

## 2021-08-23 DIAGNOSIS — Z98.890 S/P ARTHROSCOPY OF RIGHT KNEE: ICD-10-CM

## 2021-08-23 DIAGNOSIS — M25.561 ACUTE PAIN OF RIGHT KNEE: ICD-10-CM

## 2021-08-23 DIAGNOSIS — Z98.890 S/P ARTHROSCOPY OF RIGHT KNEE: Primary | ICD-10-CM

## 2021-08-23 PROCEDURE — 97530 THERAPEUTIC ACTIVITIES: CPT | Mod: GP | Performed by: PHYSICAL THERAPIST

## 2021-08-23 PROCEDURE — 99024 POSTOP FOLLOW-UP VISIT: CPT | Performed by: ORTHOPAEDIC SURGERY

## 2021-08-23 PROCEDURE — 97140 MANUAL THERAPY 1/> REGIONS: CPT | Mod: GP | Performed by: PHYSICAL THERAPIST

## 2021-08-23 ASSESSMENT — MIFFLIN-ST. JEOR: SCORE: 1427.2

## 2021-08-23 NOTE — LETTER
8/23/2021         RE: Brittni Massey  [address] [address] [address]        Dear Colleague,    Thank you for referring your patient, Brittni Massey, to the [facility]. Please see a copy of my visit note below.    Chief Complaint   Patient presents with     Right Knee - Surgical Followup     Knee(s) Surgery Followup     Pt is s/p R knee arthroscoy 06/03/21. She is currently in PT 1x week. c/o swelling daily. Pain scale is at a 0       SURGERY:  ([facility] [place] )  1.  Right knee arthroscopic partial lateral meniscectomy.  2.  Right knee arthroscopic partial medial meniscectomy.  3.  Right knee arthroscopic loose body removal.  4.  Right knee arthroscopic medial plica resection.  5.  Right knee shaving chondroplasty of the medial tibia, medial femoral condyle, femoral trochlea, patella.  6.  Right knee arthroscopic ACL cyst debridement.  DATE OF SURGERY: 6/3/2021.      HISTORY OF PRESENT ILLNESS:  Brittni Massey is a 46 year old female seen for postoperative evaluation of a right knee arthroscopy and medial and lateral meniscal debridement for medial meniscus and lateral meniscus tears. Surgery occurred 11.5 weeks ago. Had aspiration/injection at last visit on 7/19/2021, still going to Physical Therapy. Returns today stating improving, still has some swelling. Doesn't really have any pain. Swelling worse as the day goes on. On feet all day at work, so notices it more. Wears compression sleeve during the day.    She's not sure the injection made much of a difference, but felt the aspiration of fluid did.    She has been working 8h shifts, but on 8/17 that was up and was made to do a double that day, 17h, and was tough. She thinks that is still too much for her.    Pain 0/10.      OR FINDINGS:  Mild suprapatellar synovitis.  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  Small medial plica.  Grade 3 fissure of the trochlea.  Grade 3  and 4 chondrosis of the anterior aspect of the medial femoral condyle, diffuse grade 3 chondrosis of the entire weightbearing portion of the medial femoral condyle.  No loose bodies in the medial or lateral gutters.  Intact ACL within the notch.  Lateral compartment with a complex tear of the posterior horn body with undersurface horizontal cleavage tear of the posterior horn into the posterior body with a small radial tear at the mid body.  Grade 1 and 2 chondrosis laterally.  Medial compartment with complex tear of the posterior horn body with displaced posterior horn body junction flap and a posterior flap with undersurface delamination.  Grade 4 chondrosis of the far medial aspect of the tibial plateau with loose bodies.  Small cyst at the base of the ACL.    Past Medical History:   Diagnosis Date     H/O colposcopy with cervical biopsy 3/16/15    ECC - LSIL, Bx - NO 1     LSIL (low grade squamous intraepithelial lesion) on Pap smear 2/24/15    + HR HPV     Motion sickness        Past Surgical History:   Procedure Laterality Date     ARTHROSCOPY KNEE Right 6/3/2021    Procedure: ARTHROSCOPY, KNEE, right medial and lateral  meniscal and chondral debridement;  Surgeon: Ulisses Hyatt MD;  Location:  OR     SURGICAL HISTORY OF -   1990    Shaved heel bone right foot.       Medications: No current outpatient medications on file.    Current Facility-Administered Medications:      bupivacaine (MARCAINE) 0.25 % injection 4 mL, 4 mL, , , Ulisses Hyatt MD, 4 mL at 07/19/21 1009     methylPREDNISolone (DEPO-MEDROL) injection 80 mg, 80 mg, , , Ulisses Hyatt MD, 80 mg at 07/19/21 1009    Allergies:   Allergies   Allergen Reactions     Sulfa Drugs Rash       REVIEW OF SYSTEMS:   CONSTITUTIONAL:NEGATIVE for fever, chills, night sweats  INTEGUMENTARY/SKIN: NEGATIVE for worrisome wound problems or redness.  MUSCULOSKELETAL:See HPI above  NEURO: NEGATIVE for weakness, dizziness or paresthesias    PHYSICAL  "EXAM:  /80   Pulse 84   Ht 1.689 m (5' 6.5\")   Wt 75.8 kg (167 lb)   SpO2 98%   BMI 26.55 kg/m     GENERAL APPEARANCE: healthy, alert, no distress  SKIN: no suspicious lesions or rashes  NEURO: Normal strength and tone, mentation intact and speech normal  PSYCH:  mentation appears normal and affect normal, not anxious  RESPIRATORY: No increased work of breathing.    right  LOWER EXTREMITY:  Gait: slight quadriceps avoidance right.  No Quad atrophy, strength weak  Intact sensation deep peroneal nerve, superficial peroneal nerve, med/lat tibial nerve, sural nerve, saphenous nerve  Intact EHL, EDL, TA, FHL, GS, quadriceps hamstrings and hip flexors  Toes warm and well perfused, brisk capillary refill. Palpable 2+ dp pulses.  calf soft and nttp or squeeze.  Edema: none    right  KNEE EXAM:    Skin: intact, no ecchymosis, no erythema  Incisions: well healed. Dry. No erythema.  ROM: full extension to 120 flexion, limited by anterior tightness.  Effusion: small  Tender: mild diffuse, mostly medial       X-RAY: none indicated.      Impression: Brittni Massey is a 46 year old female 11.5 weeks status post right knee arthroscopy and medial and lateral meniscus debridement, small effusion, pain, decreased range of motion, primary osteoarthritis .      Plan: routine postoperative knee arthroscopy  * discomfort/pressure in knee with bending likely related to swelling, worse at end of day from activities. This should gradually continue to improve.  * WB status: weight bearing as tolerated .   * Rest  * Activity modification - avoid activities that aggravate symptoms.  * NSAIDS - regular use for inflammation, with food, as long as no contra-indications. Please discuss with pcp if needed.  * Ice twice daily to three times daily as needed.  * Compression wrap as needed.  * Elevation of extremity to reduce swelling as needed.  * PT and home exercise program for strengthening, stretching and range of motion exercises, " effusion control.  * Tylenol as needed for pain  * workability note. 8h/day x4 more weeks.  * return to clinic as needed.  * consider repeat aspiration/injection with either a different cortisone or hyaluronic acid, as needed.      * We did also discuss that based on the amount of arthritic changes seen at the time of surgery, may have continued knee pain due to the arthritis. Once recovered from the knee arthroscopy, and arthritic symptoms persist, consider full treatment of arthritis starting with Physical Therapy and injections, NSAIDS, activity modification, bracing.      Ulisses Hyatt M.D., M.S.  Dept. of Orthopaedic Surgery  [facility]          Again, thank you for allowing me to participate in the care of your patient.        Sincerely,        Ulisses Hyatt MD

## 2021-08-23 NOTE — PROGRESS NOTES
"Subjective:  HPI  Physical Exam                    Objective:  System    Physical Exam    General     ROS    Assessment/Plan:    PROGRESS  REPORT    Progress reporting period is from 06/28/2021 to 08/23/2021.       SUBJECTIVE  Subjective: Pt reports \"swelling is still an issue.\"  Overall still gets stiff and \"limping around assisted through the day.\"    Current Pain level:  (not rated numerically).     Initial Pain level: 3/10.   Changes in function:  Yes (See Goal flowsheet attached for changes in current functional level)  Adverse reaction to treatment or activity: None    OBJECTIVE  Objective: AROM 2-0-106 upon arrival.  Joint line circumference 39.6 cm R, 37.1 cm L upon arrival.  After session, AROM 118 flexion, joint line circumference 37.8 cm.  Able to achieve reciprocal pattern on stairs up and down without railing.     ASSESSMENT/PLAN  Updated problem list and treatment plan: Diagnosis 1:  S/p R knee scope -- see plan below  STG/LTGs have been met or progress has been made towards goals:  Yes (See Goal flow sheet completed today.)  Assessment of Progress: The patient's condition is improving.  Self Management Plans:  Patient has been instructed in a home treatment program.  I have re-evaluated this patient and find that the nature, scope, duration and intensity of the therapy is appropriate for the medical condition of the patient.  Brittni continues to require the following intervention to meet STG and LTG's:  PT    Recommendations:  Pt overall improving functionally, although still battling ongoing and recurrent swelling.  Able to achieve temporary change there but difficulty achieving sustained change.  F/u with Dr Hyatt immediately after PT today.    Please refer to the daily flowsheet for treatment today, total treatment time and time spent performing 1:1 timed codes.          "

## 2021-08-23 NOTE — LETTER
[facility]  [address]  [place] [address]  [phone]      August 23, 2021      RE: Brittni Massey  [address] [address] [address]       To whom it may concern:    Brittni Massey was seen in our clinic today.  She may return to work with no restrictions except 8 hour/max per day. Brittni will follow up in one month on or about September 20th.    Sincerely,      Ulisses Hyatt MD  Orthopedic Surgery

## 2021-08-30 ENCOUNTER — THERAPY VISIT (OUTPATIENT)
Dept: PHYSICAL THERAPY | Facility: CLINIC | Age: 46
End: 2021-08-30
Payer: COMMERCIAL

## 2021-08-30 DIAGNOSIS — Z98.890 S/P ARTHROSCOPY OF RIGHT KNEE: ICD-10-CM

## 2021-08-30 DIAGNOSIS — R60.0 LOCALIZED EDEMA: ICD-10-CM

## 2021-08-30 DIAGNOSIS — M25.561 ACUTE PAIN OF RIGHT KNEE: ICD-10-CM

## 2021-08-30 PROCEDURE — 97530 THERAPEUTIC ACTIVITIES: CPT | Mod: GP | Performed by: PHYSICAL THERAPIST

## 2021-08-30 PROCEDURE — 97110 THERAPEUTIC EXERCISES: CPT | Mod: GP | Performed by: PHYSICAL THERAPIST

## 2021-08-30 PROCEDURE — 97140 MANUAL THERAPY 1/> REGIONS: CPT | Mod: GP | Performed by: PHYSICAL THERAPIST

## 2021-08-30 NOTE — PROGRESS NOTES
"Subjective:  HPI  Physical Exam                    Objective:  System    Physical Exam    General     ROS    Assessment/Plan:    PROGRESS  REPORT    Progress reporting period is from 06/28/2021 to 08/30/2021.       SUBJECTIVE  Subjective: Pt reports she has seen Dr Hyatt who advised \"it just may take several months.\"  Current work restrictions are eight hours max so will avoide double shifts.  Has just worked five days in a row and is somewhat \"stiff and swollen.\"    Current Pain level:  (\"swollen and stiff\").     Initial Pain level: 3/10.   Changes in function:  Yes (See Goal flowsheet attached for changes in current functional level)  Adverse reaction to treatment or activity: None    OBJECTIVE  Objective: Joint line circumference 39 cm R, 37 cm L.  AROM 100 flexion upon arrival.  Following session, AROM 115 with joint line circumference 38.7 cm.     ASSESSMENT/PLAN  Updated problem list and treatment plan: Diagnosis 1:  R knee pain s/p surgery -- see plan below  STG/LTGs have been met or progress has been made towards goals:  Yes (See Goal flow sheet completed today.)  Assessment of Progress: The patient's condition has potential to improve.  Self Management Plans:  Patient is independent in a home treatment program.  I have re-evaluated this patient and find that the nature, scope, duration and intensity of the therapy is appropriate for the medical condition of the patient.  Brittni continues to require the following intervention to meet STG and LTG's:  PT    Recommendations:  Pt continues to present with swelling and limited motion that do change with intervention in clinic but would like to see more sustained response.  Four visits over four weeks.    Please refer to the daily flowsheet for treatment today, total treatment time and time spent performing 1:1 timed codes.          " Simple: Patient demonstrates quick and easy understanding/Verbalized Understanding

## 2021-09-13 ENCOUNTER — THERAPY VISIT (OUTPATIENT)
Dept: PHYSICAL THERAPY | Facility: CLINIC | Age: 46
End: 2021-09-13
Payer: COMMERCIAL

## 2021-09-13 DIAGNOSIS — M25.561 ACUTE PAIN OF RIGHT KNEE: ICD-10-CM

## 2021-09-13 DIAGNOSIS — Z98.890 S/P ARTHROSCOPY OF RIGHT KNEE: ICD-10-CM

## 2021-09-13 DIAGNOSIS — R60.0 LOCALIZED EDEMA: ICD-10-CM

## 2021-09-13 PROCEDURE — 97110 THERAPEUTIC EXERCISES: CPT | Mod: GP | Performed by: PHYSICAL THERAPIST

## 2021-09-13 PROCEDURE — 97140 MANUAL THERAPY 1/> REGIONS: CPT | Mod: GP | Performed by: PHYSICAL THERAPIST

## 2021-09-13 NOTE — PROGRESS NOTES
Subjective:  HPI  Physical Exam                    Objective:  System    Physical Exam    General     ROS    Assessment/Plan:    SUBJECTIVE  Subjective: Pt reports the swelling continues to be the most problematic issue.  Does feel like elevation and icing are helpful in the short term.   Current pain level: not rated numerically     Changes in function:  Yes (See Goal flowsheet attached for changes in current functional level)     Adverse reaction to treatment or activity:  None    OBJECTIVE  Objective: Joint line circumference 39.3 cm R, 38.1 cm L.  AROM flexion 105 upon arrival.     ASSESSMENT  Brittni continues to require intervention to meet STG and LTG's: PT  Have been noting difficulty with swelling and flexion.  Response to therapy has shown an improvement in  function  Progress made towards STG/LTG?  Yes (See Goal flowsheet attached for updates on achievement of STG and LTG)    PLAN  Good response to joint mobilizations in clinic.  Assess response this week and connect via [employer] early next week to understand sustained response and develop further plan.    PTA/ATC plan:  N/A    Please refer to the daily flowsheet for treatment today, total treatment time and time spent performing 1:1 timed codes.

## 2021-10-11 ENCOUNTER — THERAPY VISIT (OUTPATIENT)
Dept: PHYSICAL THERAPY | Facility: CLINIC | Age: 46
End: 2021-10-11
Payer: COMMERCIAL

## 2021-10-11 ENCOUNTER — TELEPHONE (OUTPATIENT)
Dept: PHYSICAL THERAPY | Facility: CLINIC | Age: 46
End: 2021-10-11

## 2021-10-11 DIAGNOSIS — M25.561 ACUTE PAIN OF RIGHT KNEE: ICD-10-CM

## 2021-10-11 DIAGNOSIS — Z98.890 S/P ARTHROSCOPY OF RIGHT KNEE: ICD-10-CM

## 2021-10-11 DIAGNOSIS — R60.0 LOCALIZED EDEMA: ICD-10-CM

## 2021-10-11 PROCEDURE — 97140 MANUAL THERAPY 1/> REGIONS: CPT | Mod: GP | Performed by: PHYSICAL THERAPIST

## 2021-10-11 PROCEDURE — 97110 THERAPEUTIC EXERCISES: CPT | Mod: GP | Performed by: PHYSICAL THERAPIST

## 2021-10-11 NOTE — PROGRESS NOTES
Subjective:  HPI  Physical Exam                    Objective:  System    Physical Exam    General     ROS    Assessment/Plan:    PROGRESS  REPORT    Progress reporting period is from 06/28/2021 to 10/11/2021.       SUBJECTIVE  Subjective: Pt reports ongoing stiffness and swelling.  Feels like there is some return of discomfort that was present before surgery.  Compression does seem to help to get through part of the day.  Once leg feels too swollen for the compression, she takes this off and notes that the pain and limp return.    Current Pain level:  (not rated numerically).     Initial Pain level: 3/10.   Changes in function:  Yes (See Goal flowsheet attached for changes in current functional level)  Adverse reaction to treatment or activity: None    OBJECTIVE  Objective: Joint line circumference 41 cm R, 39.3 cm L.  AROM 112 flexion upon arrival.  Following session, joint line circumference 40.2 cm, AROM flexion 118.     ASSESSMENT/PLAN  Updated problem list and treatment plan: Diagnosis 1:  S/p R knee surgery -- see plan below  STG/LTGs have been met or progress has been made towards goals:  Yes (See Goal flow sheet completed today.)  Assessment of Progress: The patient's condition has potential to improve.  Self Management Plans:  Patient is independent in a home treatment program.  I have re-evaluated this patient and find that the nature, scope, duration and intensity of the therapy is appropriate for the medical condition of the patient.  Brittni continues to require the following intervention to meet STG and LTG's: see plan below    Recommendations:  Pt presents with ongoing swelling and difficulty with function.  While she does get some temporary relief with tubigrip, appears this isn't sufficient at this point.  Consider NORMA stockings vs another idea to address swelling?  Will plan to confer with Dr Hyatt.    Please refer to the daily flowsheet for treatment today, total treatment time and time spent  performing 1:1 timed codes.

## 2021-10-27 ENCOUNTER — MYC MEDICAL ADVICE (OUTPATIENT)
Dept: ORTHOPEDICS | Facility: CLINIC | Age: 46
End: 2021-10-27

## 2021-12-02 ENCOUNTER — MYC MEDICAL ADVICE (OUTPATIENT)
Dept: FAMILY MEDICINE | Facility: CLINIC | Age: 46
End: 2021-12-02
Payer: COMMERCIAL

## 2021-12-02 DIAGNOSIS — M25.561 RIGHT KNEE PAIN, UNSPECIFIED CHRONICITY: Primary | ICD-10-CM

## 2021-12-13 PROBLEM — Z98.890 S/P ARTHROSCOPY OF RIGHT KNEE: Status: RESOLVED | Noted: 2021-06-28 | Resolved: 2021-12-13

## 2021-12-13 PROBLEM — M25.561 ACUTE PAIN OF RIGHT KNEE: Status: RESOLVED | Noted: 2021-06-28 | Resolved: 2021-12-13

## 2021-12-13 PROBLEM — R60.0 LOCALIZED EDEMA: Status: RESOLVED | Noted: 2021-06-28 | Resolved: 2021-12-13

## 2021-12-13 NOTE — PROGRESS NOTES
Pt last seen in PT 10/11.  See exchange of [employer] messages with her since then.  No further PT is scheduled.  Consider note dated 10/11 to serve as final summary.

## 2022-01-16 ENCOUNTER — HEALTH MAINTENANCE LETTER (OUTPATIENT)
Age: 47
End: 2022-01-16

## 2022-03-14 ENCOUNTER — OFFICE VISIT (OUTPATIENT)
Dept: ANESTHESIOLOGY | Facility: CLINIC | Age: 47
End: 2022-03-14
Payer: COMMERCIAL

## 2022-03-14 VITALS — OXYGEN SATURATION: 100 % | HEART RATE: 85 BPM | DIASTOLIC BLOOD PRESSURE: 79 MMHG | SYSTOLIC BLOOD PRESSURE: 122 MMHG

## 2022-03-14 DIAGNOSIS — M17.11 PRIMARY OSTEOARTHRITIS OF RIGHT KNEE: Primary | ICD-10-CM

## 2022-03-14 PROCEDURE — 99204 OFFICE O/P NEW MOD 45 MIN: CPT

## 2022-03-14 RX ORDER — LIDOCAINE 40 MG/G
CREAM TOPICAL
Status: CANCELLED | OUTPATIENT
Start: 2022-03-14

## 2022-03-14 ASSESSMENT — PAIN SCALES - GENERAL: PAINLEVEL: MILD PAIN (3)

## 2022-03-14 NOTE — PROGRESS NOTES
Brittni Massey is a 46 year old female with no significant past medical history  presents with a chief complaint of right knee pain. She cannot identify an inciting event.  In addition to the pain she reports swelling    The pain has been present for almost two years .    The pain is Mild Pain (3) in severity.    The pain is described as burning, aching.   The pain is alleviated by rest,ice.    It is exacerbated by walking, standing.    Modalities that have been utilized in the past which were helpful include C/S injection, PT, lidoderm patches.    Things that were not helpful, but tried ,include surgery, Norco, ibuprofen, biofreeze.    The patient has never tried voltaren gel.      No current outpatient medications on file.     Current Facility-Administered Medications   Medication     bupivacaine (MARCAINE) 0.25 % injection 4 mL     methylPREDNISolone (DEPO-MEDROL) injection 80 mg     Allergies   Allergen Reactions     Sulfa Drugs Rash      Past Medical History:   Diagnosis Date     H/O colposcopy with cervical biopsy 3/16/15    ECC - LSIL, Bx - NO 1     LSIL (low grade squamous intraepithelial lesion) on Pap smear 2/24/15    + HR HPV     Motion sickness      Past Surgical History:   Procedure Laterality Date     ARTHROSCOPY KNEE Right 6/3/2021    Procedure: ARTHROSCOPY, KNEE, right medial and lateral  meniscal and chondral debridement;  Surgeon: Ulisses Hyatt MD;  Location:  OR     SURGICAL HISTORY OF -   1990    Shaved heel bone right foot.     Family History   Problem Relation Age of Onset     Heart Disease Mother         MI before age 40. congenital defect.      Hypertension Father      Circulatory Maternal Grandfather         brain aneurysm     Cancer Paternal Grandfather         throat and mouth, smoker     Social History     Socioeconomic History     Marital status:      Spouse name: None     Number of children: None     Years of education: None     Highest education level: None  "  Occupational History     None   Tobacco Use     Smoking status: Never Smoker     Smokeless tobacco: Never Used     Tobacco comment: Nonsmoking household   Substance and Sexual Activity     Alcohol use: Yes     Comment: Very very rarely     Drug use: No     Sexual activity: Yes     Partners: Male     Birth control/protection: Surgical     Comment: Vasectomy   Other Topics Concern     Parent/sibling w/ CABG, MI or angioplasty before 65F 55M? Not Asked   Social History Narrative     None     Social Determinants of Health     Financial Resource Strain: Not on file   Food Insecurity: Not on file   Transportation Needs: Not on file   Physical Activity: Not on file   Stress: Not on file   Social Connections: Not on file   Intimate Partner Violence: Not on file   Housing Stability: Not on file      ROS: 10 point ROS neg other than the symptoms noted above in the HPI.  Physical Exam  Musculoskeletal:      Right knee: Bony tenderness and crepitus present. No swelling, deformity, effusion, erythema, ecchymosis or lacerations. Decreased range of motion. Tenderness present over the medial joint line. No LCL laxity, MCL laxity, ACL laxity or PCL laxity. Abnormal alignment. Normal meniscus and normal patellar mobility. Normal pulse.       RIGHT KNEE THREE VIEWS  1/25/2021 10:02 AM      HISTORY: Right knee pain, unspecified chronicity.     COMPARISON: None.                                                                      IMPRESSION: Mild medial compartment joint space narrowing. Small joint  effusion. Otherwise unremarkable.     ALIYA MCKEON MD  A/P:The patient has agreed to participate in the SKOAP trial.  She meets criteria.  She will be randomized into a \"nerve group\".  Preauthorization be attempted through the insurance.  "

## 2022-03-14 NOTE — NURSING NOTE
Patient presents with:  Consult: UMP NEW,RM 12, patient reports, 3/10 pain in her R knee      Mild Pain (3)         What medications are you using for pain?   N/a    (New patients only) Have you been seen by another pain clinic/ provider? N/A      R knee pain AIDEE Gaston, EMT

## 2022-03-14 NOTE — LETTER
3/14/2022       RE: Brittni Massey  [address] [address] [facility] [address]     Dear Colleague,    Thank you for referring your patient, Brittni Massey, to the [facility] [place] at [facility]. Please see a copy of my visit note below.    Brittni Massey is a 46 year old female with no significant past medical history  presents with a chief complaint of right knee pain. She cannot identify an inciting event.  In addition to the pain she reports swelling    The pain has been present for almost two years .    The pain is Mild Pain (3) in severity.    The pain is described as burning, aching.   The pain is alleviated by rest,ice.    It is exacerbated by walking, standing.    Modalities that have been utilized in the past which were helpful include C/S injection, PT, lidoderm patches.    Things that were not helpful, but tried ,include surgery, Norco, ibuprofen, biofreeze.    The patient has never tried voltaren gel.      No current outpatient medications on file.     Current Facility-Administered Medications   Medication     bupivacaine (MARCAINE) 0.25 % injection 4 mL     methylPREDNISolone (DEPO-MEDROL) injection 80 mg     Allergies   Allergen Reactions     Sulfa Drugs Rash      Past Medical History:   Diagnosis Date     H/O colposcopy with cervical biopsy 3/16/15    ECC - LSIL, Bx - NO 1     LSIL (low grade squamous intraepithelial lesion) on Pap smear 2/24/15    + HR HPV     Motion sickness      Past Surgical History:   Procedure Laterality Date     ARTHROSCOPY KNEE Right 6/3/2021    Procedure: ARTHROSCOPY, KNEE, right medial and lateral  meniscal and chondral debridement;  Surgeon: Ulisses Hyatt MD;  Location:  OR     SURGICAL HISTORY OF -   1990    Shaved heel bone right foot.     Family History   Problem Relation Age of Onset     Heart Disease Mother         MI before age 40. congenital  defect.      Hypertension Father      Circulatory Maternal Grandfather         brain aneurysm     Cancer Paternal Grandfather         throat and mouth, smoker     Social History     Socioeconomic History     Marital status:      Spouse name: None     Number of children: None     Years of education: None     Highest education level: None   Occupational History     None   Tobacco Use     Smoking status: Never Smoker     Smokeless tobacco: Never Used     Tobacco comment: Nonsmoking household   Substance and Sexual Activity     Alcohol use: Yes     Comment: Very very rarely     Drug use: No     Sexual activity: Yes     Partners: Male     Birth control/protection: Surgical     Comment: Vasectomy   Other Topics Concern     Parent/sibling w/ CABG, MI or angioplasty before 65F 55M? Not Asked   Social History Narrative     None     Social Determinants of Health     Financial Resource Strain: Not on file   Food Insecurity: Not on file   Transportation Needs: Not on file   Physical Activity: Not on file   Stress: Not on file   Social Connections: Not on file   Intimate Partner Violence: Not on file   Housing Stability: Not on file      ROS: 10 point ROS neg other than the symptoms noted above in the HPI.  Physical Exam  Musculoskeletal:      Right knee: Bony tenderness and crepitus present. No swelling, deformity, effusion, erythema, ecchymosis or lacerations. Decreased range of motion. Tenderness present over the medial joint line. No LCL laxity, MCL laxity, ACL laxity or PCL laxity. Abnormal alignment. Normal meniscus and normal patellar mobility. Normal pulse.       RIGHT KNEE THREE VIEWS  1/25/2021 10:02 AM      HISTORY: Right knee pain, unspecified chronicity.     COMPARISON: None.                                                                      IMPRESSION: Mild medial compartment joint space narrowing. Small joint  effusion. Otherwise unremarkable.     ALIYA MCKEON MD  A/P:The patient has agreed to  "participate in the SKOAP trial.  She meets criteria.  She will be randomized into a \"nerve group\".  Preauthorization be attempted through the insurance.        Rafal Robertson MD  "

## 2022-03-15 ENCOUNTER — TELEPHONE (OUTPATIENT)
Dept: ANESTHESIOLOGY | Facility: CLINIC | Age: 47
End: 2022-03-15
Payer: COMMERCIAL

## 2022-03-15 PROBLEM — M17.11 PRIMARY OSTEOARTHRITIS OF RIGHT KNEE: Status: ACTIVE | Noted: 2022-03-15

## 2022-03-24 ENCOUNTER — MYC MEDICAL ADVICE (OUTPATIENT)
Dept: ORTHOPEDICS | Facility: CLINIC | Age: 47
End: 2022-03-24
Payer: COMMERCIAL

## 2022-03-24 DIAGNOSIS — M17.11 PRIMARY OSTEOARTHRITIS OF RIGHT KNEE: Primary | ICD-10-CM

## 2022-03-30 ENCOUNTER — ANCILLARY PROCEDURE (OUTPATIENT)
Dept: GENERAL RADIOLOGY | Facility: CLINIC | Age: 47
End: 2022-03-30
Attending: ORTHOPAEDIC SURGERY
Payer: COMMERCIAL

## 2022-03-30 ENCOUNTER — OFFICE VISIT (OUTPATIENT)
Dept: ORTHOPEDICS | Facility: CLINIC | Age: 47
End: 2022-03-30
Payer: COMMERCIAL

## 2022-03-30 VITALS
HEIGHT: 67 IN | DIASTOLIC BLOOD PRESSURE: 85 MMHG | WEIGHT: 170.4 LBS | BODY MASS INDEX: 26.74 KG/M2 | HEART RATE: 90 BPM | SYSTOLIC BLOOD PRESSURE: 122 MMHG

## 2022-03-30 DIAGNOSIS — M17.11 PRIMARY OSTEOARTHRITIS OF RIGHT KNEE: ICD-10-CM

## 2022-03-30 DIAGNOSIS — G89.29 CHRONIC PAIN OF RIGHT KNEE: Primary | ICD-10-CM

## 2022-03-30 DIAGNOSIS — Z98.890 S/P ARTHROSCOPY OF RIGHT KNEE: ICD-10-CM

## 2022-03-30 DIAGNOSIS — M25.561 CHRONIC PAIN OF RIGHT KNEE: Primary | ICD-10-CM

## 2022-03-30 PROCEDURE — 73562 X-RAY EXAM OF KNEE 3: CPT | Mod: RT | Performed by: RADIOLOGY

## 2022-03-30 PROCEDURE — 99214 OFFICE O/P EST MOD 30 MIN: CPT | Performed by: ORTHOPAEDIC SURGERY

## 2022-03-30 ASSESSMENT — PAIN SCALES - GENERAL: PAINLEVEL: SEVERE PAIN (6)

## 2022-03-30 NOTE — PROGRESS NOTES
Chief Complaint   Patient presents with     Right Knee - Pain     Right knee pain. S/P right knee scope on 6/3/21. Last injection on 7/2021 provided no relief for her knee pain. She is interested in succussion a gel injection. She has been icing and NSAIDs as needed. Pain today rated as 6/10.       SURGERY:  ([facility] [place] )  1.  Right knee arthroscopic partial lateral meniscectomy.  2.  Right knee arthroscopic partial medial meniscectomy.  3.  Right knee arthroscopic loose body removal.  4.  Right knee arthroscopic medial plica resection.  5.  Right knee shaving chondroplasty of the medial tibia, medial femoral condyle, femoral trochlea, patella.  6.  Right knee arthroscopic ACL cyst debridement.  DATE OF SURGERY: 6/3/2021.      HISTORY OF PRESENT ILLNESS:  Brittni Massey is a 47 year old female seen for right knee pain. Most pain along the inner aspect. Worse as the day goes on at work. Treatment with ice packs, occasional ibuprofen. More pain, swelling recently. Pain 6/10 today. Hasn't been able to fully bend the knee.    She is 10 months postoperative of a right knee arthroscopy and medial and lateral meniscal debridement for medial meniscus and lateral meniscus tears.  Had aspiration/injection on 7/19/2021, and when seen 8/2021, wasn't really having any pain, but swelling with increased activities.    On feet all day at work, so notices it more. Wears compression sleeve during the day.      OR FINDINGS:  Mild suprapatellar synovitis.  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  Small medial plica.  Grade 3 fissure of the trochlea.  Grade 3 and 4 chondrosis of the anterior aspect of the medial femoral condyle, diffuse grade 3 chondrosis of the entire weightbearing portion of the medial femoral condyle.  No loose bodies in the medial or lateral gutters.  Intact ACL within the notch.  Lateral compartment with a complex tear of the posterior horn body with undersurface horizontal  "cleavage tear of the posterior horn into the posterior body with a small radial tear at the mid body.  Grade 1 and 2 chondrosis laterally.  Medial compartment with complex tear of the posterior horn body with displaced posterior horn body junction flap and a posterior flap with undersurface delamination.  Grade 4 chondrosis of the far medial aspect of the tibial plateau with loose bodies.  Small cyst at the base of the ACL.    Past Medical History:   Diagnosis Date     H/O colposcopy with cervical biopsy 3/16/15    ECC - LSIL, Bx - NO 1     LSIL (low grade squamous intraepithelial lesion) on Pap smear 2/24/15    + HR HPV     Motion sickness        Past Surgical History:   Procedure Laterality Date     ARTHROSCOPY KNEE Right 6/3/2021    Procedure: ARTHROSCOPY, KNEE, right medial and lateral  meniscal and chondral debridement;  Surgeon: Ulisses Hyatt MD;  Location:  OR     SURGICAL HISTORY OF -   1990    Shaved heel bone right foot.       Medications: No current outpatient medications on file.    Current Facility-Administered Medications:      bupivacaine (MARCAINE) 0.25 % injection 4 mL, 4 mL, , , Ulisses Hyatt MD, 4 mL at 07/19/21 1009     methylPREDNISolone (DEPO-MEDROL) injection 80 mg, 80 mg, , , Ulisses Hyatt MD, 80 mg at 07/19/21 1009    Allergies:   Allergies   Allergen Reactions     Sulfa Drugs Rash       REVIEW OF SYSTEMS:   CONSTITUTIONAL:NEGATIVE for fever, chills, night sweats  INTEGUMENTARY/SKIN: NEGATIVE for worrisome wound problems or redness.  MUSCULOSKELETAL:See HPI above  NEURO: NEGATIVE for weakness, dizziness or paresthesias    PHYSICAL EXAM:  /85   Pulse 90   Ht 1.702 m (5' 7\")   Wt 77.3 kg (170 lb 6.4 oz)   BMI 26.69 kg/m     GENERAL APPEARANCE: healthy, alert, no distress  SKIN: no suspicious lesions or rashes  NEURO: Normal strength and tone, mentation intact and speech normal  PSYCH:  mentation appears normal and affect normal, not anxious  RESPIRATORY: No " increased work of breathing.    right  LOWER EXTREMITY:  Gait: slight quadriceps avoidance right.  No Quad atrophy, strength weak  Intact sensation deep peroneal nerve, superficial peroneal nerve, med/lat tibial nerve, sural nerve, saphenous nerve  Intact EHL, EDL, TA, FHL, GS, quadriceps hamstrings and hip flexors  calf soft and nttp or squeeze.  Edema: none    right  KNEE EXAM:    Skin: intact, no ecchymosis, no erythema  Incisions: well healed. Dry. No erythema.  ROM: full extension to 120 flexion, limited by anterior tightness/pain.  Effusion: trace  Tender: mild diffuse, mostly medial     LEFT KNEE EXAM  Skin: intact.   Nontender to palpation  Range of motion: full extension, 130+ flexion  No effusion  Stable ligamentous exam.    X-RAY: 3 views right knee 3/30/2022 reviewed, Mild to moderate medial compartment joint space narrowing and marginal osteophyte formation. No fracture or joint effusion. Medial compartment joint space narrowing is slightly more prominent than on the comparison study.      Impression: Brittni Massey is a 47 year old female 10 months status post right knee arthroscopy and medial and lateral meniscus debridement, pain, decreased range of motion, primary osteoarthritis .      Plan:   * images reviewed, notable medial narrowing, where most of her pain is located.  * arthroscopy images again reviewed, noting irregular cartilage medial tibia to bone  * unclear of decreased range of motion, as doesn't appear to be due to swelling or effusion within the knee, but rather from decreased use.  * given age, would try to hold off on knee replacement surgery  * likely looking at possible medial unicompartmental knee replacement as most symptoms medially, but there is some patello-femoral disease as well.  * could have her see Dr Acuña if possible tibial osteotomy to unload the medial compartment with a cartilage restoration procedure would be an option, or likely too far advanced.  * will place  preauthorization for viscosupplementation injection  * will place referral to see Dr Acuña.          Ulisses Hyatt M.D., M.S.  Dept. of Orthopaedic Surgery  [facility]

## 2022-03-30 NOTE — LETTER
3/30/2022         RE: Brittni Massey  [address] [facility] [address]        Dear Colleague,    Thank you for referring your patient, Brittni Massey, to the [facility] [place]. Please see a copy of my visit note below.    Chief Complaint   Patient presents with     Right Knee - Pain     Right knee pain. S/P right knee scope on 6/3/21. Last injection on 7/2021 provided no relief for her knee pain. She is interested in succussion a gel injection. She has been icing and NSAIDs as needed. Pain today rated as 6/10.       SURGERY:  ([facility] [place] )  1.  Right knee arthroscopic partial lateral meniscectomy.  2.  Right knee arthroscopic partial medial meniscectomy.  3.  Right knee arthroscopic loose body removal.  4.  Right knee arthroscopic medial plica resection.  5.  Right knee shaving chondroplasty of the medial tibia, medial femoral condyle, femoral trochlea, patella.  6.  Right knee arthroscopic ACL cyst debridement.  DATE OF SURGERY: 6/3/2021.      HISTORY OF PRESENT ILLNESS:  Brittni Massey is a 47 year old female seen for right knee pain. Most pain along the inner aspect. Worse as the day goes on at work. Treatment with ice packs, occasional ibuprofen. More pain, swelling recently. Pain 6/10 today. Hasn't been able to fully bend the knee.    She is 10 months postoperative of a right knee arthroscopy and medial and lateral meniscal debridement for medial meniscus and lateral meniscus tears.  Had aspiration/injection on 7/19/2021, and when seen 8/2021, wasn't really having any pain, but swelling with increased activities.    On feet all day at work, so notices it more. Wears compression sleeve during the day.      OR FINDINGS:  Mild suprapatellar synovitis.  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  Small medial plica.  Grade 3 fissure of the trochlea.  Grade 3 and 4 chondrosis of the anterior aspect of the medial femoral condyle,  "diffuse grade 3 chondrosis of the entire weightbearing portion of the medial femoral condyle.  No loose bodies in the medial or lateral gutters.  Intact ACL within the notch.  Lateral compartment with a complex tear of the posterior horn body with undersurface horizontal cleavage tear of the posterior horn into the posterior body with a small radial tear at the mid body.  Grade 1 and 2 chondrosis laterally.  Medial compartment with complex tear of the posterior horn body with displaced posterior horn body junction flap and a posterior flap with undersurface delamination.  Grade 4 chondrosis of the far medial aspect of the tibial plateau with loose bodies.  Small cyst at the base of the ACL.    Past Medical History:   Diagnosis Date     H/O colposcopy with cervical biopsy 3/16/15    ECC - LSIL, Bx - NO 1     LSIL (low grade squamous intraepithelial lesion) on Pap smear 2/24/15    + HR HPV     Motion sickness        Past Surgical History:   Procedure Laterality Date     ARTHROSCOPY KNEE Right 6/3/2021    Procedure: ARTHROSCOPY, KNEE, right medial and lateral  meniscal and chondral debridement;  Surgeon: Ulisses Hyatt MD;  Location:  OR     SURGICAL HISTORY OF -   1990    Shaved heel bone right foot.       Medications: No current outpatient medications on file.    Current Facility-Administered Medications:      bupivacaine (MARCAINE) 0.25 % injection 4 mL, 4 mL, , , Ulisses Hyatt MD, 4 mL at 07/19/21 1009     methylPREDNISolone (DEPO-MEDROL) injection 80 mg, 80 mg, , , Ulisses Hyatt MD, 80 mg at 07/19/21 1009    Allergies:   Allergies   Allergen Reactions     Sulfa Drugs Rash       REVIEW OF SYSTEMS:   CONSTITUTIONAL:NEGATIVE for fever, chills, night sweats  INTEGUMENTARY/SKIN: NEGATIVE for worrisome wound problems or redness.  MUSCULOSKELETAL:See HPI above  NEURO: NEGATIVE for weakness, dizziness or paresthesias    PHYSICAL EXAM:  /85   Pulse 90   Ht 1.702 m (5' 7\")   Wt 77.3 kg (170 lb 6.4 " oz)   BMI 26.69 kg/m     GENERAL APPEARANCE: healthy, alert, no distress  SKIN: no suspicious lesions or rashes  NEURO: Normal strength and tone, mentation intact and speech normal  PSYCH:  mentation appears normal and affect normal, not anxious  RESPIRATORY: No increased work of breathing.    right  LOWER EXTREMITY:  Gait: slight quadriceps avoidance right.  No Quad atrophy, strength weak  Intact sensation deep peroneal nerve, superficial peroneal nerve, med/lat tibial nerve, sural nerve, saphenous nerve  Intact EHL, EDL, TA, FHL, GS, quadriceps hamstrings and hip flexors  calf soft and nttp or squeeze.  Edema: none    right  KNEE EXAM:    Skin: intact, no ecchymosis, no erythema  Incisions: well healed. Dry. No erythema.  ROM: full extension to 120 flexion, limited by anterior tightness/pain.  Effusion: trace  Tender: mild diffuse, mostly medial     LEFT KNEE EXAM  Skin: intact.   Nontender to palpation  Range of motion: full extension, 130+ flexion  No effusion  Stable ligamentous exam.    X-RAY: 3 views right knee 3/30/2022 reviewed, Mild to moderate medial compartment joint space narrowing and marginal osteophyte formation. No fracture or joint effusion. Medial compartment joint space narrowing is slightly more prominent than on the comparison study.      Impression: Brittni Massey is a 47 year old female 10 months status post right knee arthroscopy and medial and lateral meniscus debridement, pain, decreased range of motion, primary osteoarthritis .      Plan:   * images reviewed, notable medial narrowing, where most of her pain is located.  * arthroscopy images again reviewed, noting irregular cartilage medial tibia to bone  * unclear of decreased range of motion, as doesn't appear to be due to swelling or effusion within the knee, but rather from decreased use.  * given age, would try to hold off on knee replacement surgery  * likely looking at possible medial unicompartmental knee replacement as most  symptoms medially, but there is some patello-femoral disease as well.  * could have her see Dr Acuña if possible tibial osteotomy to unload the medial compartment with a cartilage restoration procedure would be an option, or likely too far advanced.  * will place preauthorization for viscosupplementation injection  * will place referral to see Dr Acuña.          Ulisses Hyatt M.D., M.S.  Dept. of Orthopaedic Surgery  [facility]          Again, thank you for allowing me to participate in the care of your patient.        Sincerely,        Ulisses Hyatt MD

## 2022-04-04 ENCOUNTER — TELEPHONE (OUTPATIENT)
Dept: ANESTHESIOLOGY | Facility: CLINIC | Age: 47
End: 2022-04-04
Payer: COMMERCIAL

## 2022-04-04 ENCOUNTER — TELEPHONE (OUTPATIENT)
Dept: ORTHOPEDICS | Facility: CLINIC | Age: 47
End: 2022-04-04

## 2022-04-04 NOTE — TELEPHONE ENCOUNTER
Patient had a message to schedule an injection with Dr. Hyatt and team, but ended up being transferred a few times before ending up talking to me. I wanted to send this to confirm that the appt that we scheduled this Thursday, 4/7/22 is okay for her planned injection. This is not something we schedule so I wanted to get ahead of it in case anything needs to be changed. Thank you!

## 2022-04-06 NOTE — PROGRESS NOTES
Chief Complaint   Patient presents with     Right Knee - Pain     Patient notes her knee is the same as last office visit. She is here to get a synvisc injection today.        SURGERY:  ([facility] [place] )  1.  Right knee arthroscopic partial lateral meniscectomy.  2.  Right knee arthroscopic partial medial meniscectomy.  3.  Right knee arthroscopic loose body removal.  4.  Right knee arthroscopic medial plica resection.  5.  Right knee shaving chondroplasty of the medial tibia, medial femoral condyle, femoral trochlea, patella.  6.  Right knee arthroscopic ACL cyst debridement.  DATE OF SURGERY: 6/3/2021.      HISTORY OF PRESENT ILLNESS:  Brittni Massey is a 47 year old female seen for right knee pain. Most pain along the inner aspect. Worse as the day goes on at work. Treatment with ice packs, occasional ibuprofen. More pain, swelling recently. Pain 7/10 today. Hasn't been able to fully bend the knee. Has pre-authorization for visco injection today.    She is 10 months postoperative of a right knee arthroscopy and medial and lateral meniscal debridement for medial meniscus and lateral meniscus tears.  Had aspiration/injection on 7/19/2021, and when seen 8/2021, wasn't really having any pain, but swelling with increased activities.    On feet all day at work, so notices it more. Wears compression sleeve during the day.      OR FINDINGS:  Mild suprapatellar synovitis.  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  Small medial plica.  Grade 3 fissure of the trochlea.  Grade 3 and 4 chondrosis of the anterior aspect of the medial femoral condyle, diffuse grade 3 chondrosis of the entire weightbearing portion of the medial femoral condyle.  No loose bodies in the medial or lateral gutters.  Intact ACL within the notch.  Lateral compartment with a complex tear of the posterior horn body with undersurface horizontal cleavage tear of the posterior horn into the posterior body with a small  "radial tear at the mid body.  Grade 1 and 2 chondrosis laterally.  Medial compartment with complex tear of the posterior horn body with displaced posterior horn body junction flap and a posterior flap with undersurface delamination.  Grade 4 chondrosis of the far medial aspect of the tibial plateau with loose bodies.  Small cyst at the base of the ACL.    Past Medical History:   Diagnosis Date     H/O colposcopy with cervical biopsy 3/16/15    ECC - LSIL, Bx - NO 1     LSIL (low grade squamous intraepithelial lesion) on Pap smear 2/24/15    + HR HPV     Motion sickness        Past Surgical History:   Procedure Laterality Date     ARTHROSCOPY KNEE Right 6/3/2021    Procedure: ARTHROSCOPY, KNEE, right medial and lateral  meniscal and chondral debridement;  Surgeon: Ulisses Hyatt MD;  Location: [facility] OR     SURGICAL HISTORY OF -   1990    Shaved heel bone right foot.       Medications: No current outpatient medications on file.    Current Facility-Administered Medications:      bupivacaine (MARCAINE) 0.25 % injection 4 mL, 4 mL, , , Ulisses Hyatt MD, 4 mL at 07/19/21 1009     methylPREDNISolone (DEPO-MEDROL) injection 80 mg, 80 mg, , , Ulisses Hyatt MD, 80 mg at 07/19/21 1009    Allergies:   Allergies   Allergen Reactions     Sulfa Drugs Rash       REVIEW OF SYSTEMS:   CONSTITUTIONAL:NEGATIVE for fever, chills, night sweats  INTEGUMENTARY/SKIN: NEGATIVE for worrisome wound problems or redness.  MUSCULOSKELETAL:See HPI above  NEURO: NEGATIVE for weakness, dizziness or paresthesias    PHYSICAL EXAM:  /74   Pulse 92   Ht 1.702 m (5' 7\")   Wt 77.3 kg (170 lb 6.4 oz)   BMI 26.69 kg/m     GENERAL APPEARANCE: healthy, alert, no distress  SKIN: no suspicious lesions or rashes  NEURO: Normal strength and tone, mentation intact and speech normal  PSYCH:  mentation appears normal and affect normal, not anxious  RESPIRATORY: No increased work of breathing.    right  LOWER EXTREMITY:  Gait: slight quadriceps " avoidance right.  No Quad atrophy, strength weak  Intact sensation deep peroneal nerve, superficial peroneal nerve, med/lat tibial nerve, sural nerve, saphenous nerve  Intact EHL, EDL, TA, FHL, GS, quadriceps hamstrings and hip flexors  calf soft and nttp or squeeze.  Edema: none    right  KNEE EXAM:    Skin: intact, no ecchymosis, no erythema  Incisions: well healed. Dry. No erythema.  ROM: full extension to 120 flexion, limited by anterior tightness/pain.  Effusion: trace  Tender: mild diffuse, mostly medial     LEFT KNEE EXAM  Skin: intact.   Nontender to palpation  Range of motion: full extension, 130+ flexion  No effusion  Stable ligamentous exam.    X-RAY: no new images today. 3 views right knee 3/30/2022 reviewed, Mild to moderate medial compartment joint space narrowing and marginal osteophyte formation. No fracture or joint effusion. Medial compartment joint space narrowing is slightly more prominent than on the comparison study.      Impression: Brittni Massey is a 47 year old female 10 months status post right knee arthroscopy and medial and lateral meniscus debridement, pain, decreased range of motion, primary osteoarthritis .      Plan:     * unclear of decreased range of motion, as doesn't appear to be due to swelling or effusion within the knee, but rather from decreased use.  * given age, would try to hold off on knee replacement surgery  * likely looking at possible medial unicompartmental knee replacement as most symptoms medially, but there is some patello-femoral disease as well.  * could have her see Dr Acuña if possible tibial osteotomy to unload the medial compartment with a cartilage restoration procedure would be an option, or likely too far advanced.   * visco injection today. See procedure note.    * return to clinic as needed.        Ulisses Hyatt M.D., M.S.  Dept. of Orthopaedic Surgery  [facility]      Large Joint Injection/Arthocentesis: R knee joint    Date/Time:  4/7/2022 4:21 PM  Performed by: Phu Ballard PA  Authorized by: Ulisses Hyatt MD     Indications:  Osteoarthritis and pain  Needle Size:  20 G  Guidance: landmark guided    Approach:  Anteromedial  Location:  Knee      Medications:  4 mL bupivacaine 0.25 %; 48 mg hylan 48 MG/6ML

## 2022-04-07 ENCOUNTER — OFFICE VISIT (OUTPATIENT)
Dept: ORTHOPEDICS | Facility: CLINIC | Age: 47
End: 2022-04-07
Payer: COMMERCIAL

## 2022-04-07 VITALS
HEIGHT: 67 IN | SYSTOLIC BLOOD PRESSURE: 122 MMHG | HEART RATE: 92 BPM | BODY MASS INDEX: 26.74 KG/M2 | DIASTOLIC BLOOD PRESSURE: 74 MMHG | WEIGHT: 170.4 LBS

## 2022-04-07 DIAGNOSIS — M25.561 CHRONIC PAIN OF RIGHT KNEE: ICD-10-CM

## 2022-04-07 DIAGNOSIS — G89.29 CHRONIC PAIN OF RIGHT KNEE: ICD-10-CM

## 2022-04-07 DIAGNOSIS — M17.11 PRIMARY OSTEOARTHRITIS OF RIGHT KNEE: Primary | ICD-10-CM

## 2022-04-07 PROCEDURE — 20610 DRAIN/INJ JOINT/BURSA W/O US: CPT | Mod: RT | Performed by: ORTHOPAEDIC SURGERY

## 2022-04-07 RX ADMIN — BUPIVACAINE HYDROCHLORIDE 4 ML: 2.5 INJECTION, SOLUTION INFILTRATION; PERINEURAL at 16:21

## 2022-04-07 ASSESSMENT — PAIN SCALES - GENERAL: PAINLEVEL: SEVERE PAIN (7)

## 2022-04-07 NOTE — LETTER
4/7/2022         RE: Brittni Massey  [id number] [address] [facility] [id number]        Dear Colleague,    Thank you for referring your patient, Brittni Massey, to the [facility]. Please see a copy of my visit note below.    Chief Complaint   Patient presents with     Right Knee - Pain     Patient notes her knee is the same as last office visit. She is here to get a synvisc injection today.        SURGERY:  ([facility] [place] )  1.  Right knee arthroscopic partial lateral meniscectomy.  2.  Right knee arthroscopic partial medial meniscectomy.  3.  Right knee arthroscopic loose body removal.  4.  Right knee arthroscopic medial plica resection.  5.  Right knee shaving chondroplasty of the medial tibia, medial femoral condyle, femoral trochlea, patella.  6.  Right knee arthroscopic ACL cyst debridement.  DATE OF SURGERY: 6/3/2021.      HISTORY OF PRESENT ILLNESS:  Brittni Massey is a 47 year old female seen for right knee pain. Most pain along the inner aspect. Worse as the day goes on at work. Treatment with ice packs, occasional ibuprofen. More pain, swelling recently. Pain 7/10 today. Hasn't been able to fully bend the knee. Has pre-authorization for visco injection today.    She is 10 months postoperative of a right knee arthroscopy and medial and lateral meniscal debridement for medial meniscus and lateral meniscus tears.  Had aspiration/injection on 7/19/2021, and when seen 8/2021, wasn't really having any pain, but swelling with increased activities.    On feet all day at work, so notices it more. Wears compression sleeve during the day.      OR FINDINGS:  Mild suprapatellar synovitis.  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  Small medial plica.  Grade 3 fissure of the trochlea.  Grade 3 and 4 chondrosis of the anterior aspect of the medial femoral condyle, diffuse grade 3 chondrosis of the entire weightbearing portion of the medial  "femoral condyle.  No loose bodies in the medial or lateral gutters.  Intact ACL within the notch.  Lateral compartment with a complex tear of the posterior horn body with undersurface horizontal cleavage tear of the posterior horn into the posterior body with a small radial tear at the mid body.  Grade 1 and 2 chondrosis laterally.  Medial compartment with complex tear of the posterior horn body with displaced posterior horn body junction flap and a posterior flap with undersurface delamination.  Grade 4 chondrosis of the far medial aspect of the tibial plateau with loose bodies.  Small cyst at the base of the ACL.    Past Medical History:   Diagnosis Date     H/O colposcopy with cervical biopsy 3/16/15    ECC - LSIL, Bx - NO 1     LSIL (low grade squamous intraepithelial lesion) on Pap smear 2/24/15    + HR HPV     Motion sickness        Past Surgical History:   Procedure Laterality Date     ARTHROSCOPY KNEE Right 6/3/2021    Procedure: ARTHROSCOPY, KNEE, right medial and lateral  meniscal and chondral debridement;  Surgeon: Ulisses Hyatt MD;  Location: [facility]     SURGICAL HISTORY OF -   1990    Shaved heel bone right foot.       Medications: No current outpatient medications on file.    Current Facility-Administered Medications:      bupivacaine (MARCAINE) 0.25 % injection 4 mL, 4 mL, , , Ulisses Hyatt MD, 4 mL at 07/19/21 1009     methylPREDNISolone (DEPO-MEDROL) injection 80 mg, 80 mg, , , Ulisses Hyatt MD, 80 mg at 07/19/21 1009    Allergies:   Allergies   Allergen Reactions     Sulfa Drugs Rash       REVIEW OF SYSTEMS:   CONSTITUTIONAL:NEGATIVE for fever, chills, night sweats  INTEGUMENTARY/SKIN: NEGATIVE for worrisome wound problems or redness.  MUSCULOSKELETAL:See HPI above  NEURO: NEGATIVE for weakness, dizziness or paresthesias    PHYSICAL EXAM:  /74   Pulse 92   Ht 1.702 m (5' 7\")   Wt 77.3 kg (170 lb 6.4 oz)   BMI 26.69 kg/m     GENERAL APPEARANCE: healthy, alert, no " distress  SKIN: no suspicious lesions or rashes  NEURO: Normal strength and tone, mentation intact and speech normal  PSYCH:  mentation appears normal and affect normal, not anxious  RESPIRATORY: No increased work of breathing.    right  LOWER EXTREMITY:  Gait: slight quadriceps avoidance right.  No Quad atrophy, strength weak  Intact sensation deep peroneal nerve, superficial peroneal nerve, med/lat tibial nerve, sural nerve, saphenous nerve  Intact EHL, EDL, TA, FHL, GS, quadriceps hamstrings and hip flexors  calf soft and nttp or squeeze.  Edema: none    right  KNEE EXAM:    Skin: intact, no ecchymosis, no erythema  Incisions: well healed. Dry. No erythema.  ROM: full extension to 120 flexion, limited by anterior tightness/pain.  Effusion: trace  Tender: mild diffuse, mostly medial     LEFT KNEE EXAM  Skin: intact.   Nontender to palpation  Range of motion: full extension, 130+ flexion  No effusion  Stable ligamentous exam.    X-RAY: no new images today. 3 views right knee 3/30/2022 reviewed, Mild to moderate medial compartment joint space narrowing and marginal osteophyte formation. No fracture or joint effusion. Medial compartment joint space narrowing is slightly more prominent than on the comparison study.      Impression: Brittni Massey is a 47 year old female 10 months status post right knee arthroscopy and medial and lateral meniscus debridement, pain, decreased range of motion, primary osteoarthritis .      Plan:     * unclear of decreased range of motion, as doesn't appear to be due to swelling or effusion within the knee, but rather from decreased use.  * given age, would try to hold off on knee replacement surgery  * likely looking at possible medial unicompartmental knee replacement as most symptoms medially, but there is some patello-femoral disease as well.  * could have her see Dr Acuña if possible tibial osteotomy to unload the medial compartment with a cartilage restoration procedure would  be an option, or likely too far advanced.   * visco injection today. See procedure note.    * return to clinic as needed.        Ulisses Hyatt M.D., M.S.  Dept. of Orthopaedic Surgery  [facility]      Large Joint Injection/Arthocentesis: R knee joint    Date/Time: 4/7/2022 4:21 PM  Performed by: Phu Ballard PA  Authorized by: Ulisses Hyatt MD     Indications:  Osteoarthritis and pain  Needle Size:  20 G  Guidance: landmark guided    Approach:  Anteromedial  Location:  Knee      Medications:  4 mL bupivacaine 0.25 %; 48 mg hylan 48 MG/6ML              Again, thank you for allowing me to participate in the care of your patient.        Sincerely,        Ulisses Hyatt MD

## 2022-04-08 RX ORDER — BUPIVACAINE HYDROCHLORIDE 2.5 MG/ML
4 INJECTION, SOLUTION INFILTRATION; PERINEURAL
Status: SHIPPED | OUTPATIENT
Start: 2022-04-07

## 2022-04-13 ENCOUNTER — MYC MEDICAL ADVICE (OUTPATIENT)
Dept: ORTHOPEDICS | Facility: CLINIC | Age: 47
End: 2022-04-13
Payer: COMMERCIAL

## 2022-04-13 ENCOUNTER — TELEPHONE (OUTPATIENT)
Dept: ANESTHESIOLOGY | Facility: CLINIC | Age: 47
End: 2022-04-13
Payer: COMMERCIAL

## 2022-04-14 ENCOUNTER — MYC MEDICAL ADVICE (OUTPATIENT)
Dept: ANESTHESIOLOGY | Facility: CLINIC | Age: 47
End: 2022-04-14
Payer: COMMERCIAL

## 2022-05-07 ENCOUNTER — HEALTH MAINTENANCE LETTER (OUTPATIENT)
Age: 47
End: 2022-05-07

## 2022-06-16 NOTE — TELEPHONE ENCOUNTER
DIAGNOSIS: cartilege JAIDEN or osteotomy right knee/XR/Ulisses Hyatt    APPOINTMENT DATE: 6.20.22   NOTES STATUS DETAILS   OFFICE NOTE from referring provider Internal 4.7.22 Dr Ulisses Hyatt [facility]  3.30.22  8.23.21  7.19.21  6.17.21 4.19.21  1.25.21   OPERATIVE REPORT Internal 6.3.21   MEDICATION LIST Internal    MRI Internal 1.19.21 R knee   XRAYS (IMAGES & REPORTS) Internal 3.30.22 R knee  1.25.21 R knee

## 2022-06-20 ENCOUNTER — PRE VISIT (OUTPATIENT)
Dept: ORTHOPEDICS | Facility: CLINIC | Age: 47
End: 2022-06-20
Payer: COMMERCIAL

## 2022-06-27 ENCOUNTER — OFFICE VISIT (OUTPATIENT)
Dept: FAMILY MEDICINE | Facility: CLINIC | Age: 47
End: 2022-06-27
Payer: COMMERCIAL

## 2022-06-27 VITALS
SYSTOLIC BLOOD PRESSURE: 131 MMHG | HEART RATE: 95 BPM | OXYGEN SATURATION: 98 % | BODY MASS INDEX: 26.68 KG/M2 | WEIGHT: 166 LBS | HEIGHT: 66 IN | DIASTOLIC BLOOD PRESSURE: 81 MMHG | TEMPERATURE: 97.8 F

## 2022-06-27 DIAGNOSIS — Z00.00 ROUTINE GENERAL MEDICAL EXAMINATION AT A HEALTH CARE FACILITY: Primary | ICD-10-CM

## 2022-06-27 DIAGNOSIS — Z83.49 FAMILY HISTORY OF THYROID DISORDER: ICD-10-CM

## 2022-06-27 DIAGNOSIS — Z13.220 SCREENING FOR HYPERLIPIDEMIA: ICD-10-CM

## 2022-06-27 PROCEDURE — 90471 IMMUNIZATION ADMIN: CPT | Performed by: PHYSICIAN ASSISTANT

## 2022-06-27 PROCEDURE — 90715 TDAP VACCINE 7 YRS/> IM: CPT | Performed by: PHYSICIAN ASSISTANT

## 2022-06-27 PROCEDURE — G0145 SCR C/V CYTO,THINLAYER,RESCR: HCPCS | Performed by: PHYSICIAN ASSISTANT

## 2022-06-27 PROCEDURE — 87624 HPV HI-RISK TYP POOLED RSLT: CPT | Performed by: PHYSICIAN ASSISTANT

## 2022-06-27 PROCEDURE — G0124 SCREEN C/V THIN LAYER BY MD: HCPCS | Performed by: PATHOLOGY

## 2022-06-27 PROCEDURE — 99396 PREV VISIT EST AGE 40-64: CPT | Mod: 25 | Performed by: PHYSICIAN ASSISTANT

## 2022-06-27 ASSESSMENT — ENCOUNTER SYMPTOMS
CONSTIPATION: 0
PARESTHESIAS: 0
HEARTBURN: 0
HEADACHES: 0
PALPITATIONS: 0
ARTHRALGIAS: 1
HEMATOCHEZIA: 0
SHORTNESS OF BREATH: 0
COUGH: 0
FREQUENCY: 0
FEVER: 0
HEMATURIA: 0
WEAKNESS: 0
DIZZINESS: 0
NERVOUS/ANXIOUS: 0
CHILLS: 0
EYE PAIN: 0
NAUSEA: 0
ABDOMINAL PAIN: 0
DYSURIA: 0
MYALGIAS: 0
SORE THROAT: 0
DIARRHEA: 0
JOINT SWELLING: 1

## 2022-06-27 ASSESSMENT — PAIN SCALES - GENERAL: PAINLEVEL: NO PAIN (0)

## 2022-06-27 NOTE — PROGRESS NOTES
SUBJECTIVE:   CC: Brittni Massey is an 47 year old woman who presents for preventive health visit.       Patient has been advised of split billing requirements and indicates understanding: Yes  Healthy Habits:     Getting at least 3 servings of Calcium per day:  NO    Bi-annual eye exam:  NO    Dental care twice a year:  NO    Sleep apnea or symptoms of sleep apnea:  None    Diet:  Regular (no restrictions)    Frequency of exercise:  4-5 days/week    Duration of exercise:  15-30 minutes    Taking medications regularly:  Not Applicable    Medication side effects:  Not applicable    PHQ-2 Total Score: 0    Additional concerns today:  No        Today's PHQ-2 Score:   PHQ-2 ( 1999 [employer]) 6/27/2022   Q1: Little interest or pleasure in doing things 0   Q2: Feeling down, depressed or hopeless 0   PHQ-2 Score 0   PHQ-2 Total Score (12-17 Years)- Positive if 3 or more points; Administer PHQ-A if positive -   Q1: Little interest or pleasure in doing things Not at all   Q2: Feeling down, depressed or hopeless Not at all   PHQ-2 Score 0       Abuse: Current or Past (Physical, Sexual or Emotional) - No  Do you feel safe in your environment? Yes    Have you ever done Advance Care Planning? (For example, a Health Directive, POLST, or a discussion with a medical provider or your loved ones about your wishes): No, advance care planning information given to patient to review.  Patient declined advance care planning discussion at this time.    Social History     Tobacco Use     Smoking status: Never Smoker     Smokeless tobacco: Never Used     Tobacco comment: Nonsmoking household   Substance Use Topics     Alcohol use: Yes     Comment: Very very rarely     If you drink alcohol do you typically have >3 drinks per day or >7 drinks per week? No    Alcohol Use 6/27/2022   Prescreen: >3 drinks/day or >7 drinks/week? No   No flowsheet data found.    Reviewed orders with patient.  Reviewed health maintenance and updated orders  accordingly - Yes  BP Readings from Last 3 Encounters:   06/27/22 131/81   04/07/22 122/74   03/30/22 122/85    Wt Readings from Last 3 Encounters:   06/27/22 75.3 kg (166 lb)   04/07/22 77.3 kg (170 lb 6.4 oz)   03/30/22 77.3 kg (170 lb 6.4 oz)                  Patient Active Problem List   Diagnosis     CARDIOVASCULAR SCREENING; LDL GOAL LESS THAN 160     Dysmenorrhea     Tear of medial meniscus of right knee, current, unspecified tear type, subsequent encounter     Tear of lateral meniscus of right knee, current, unspecified tear type, subsequent encounter     Primary osteoarthritis of right knee     Past Surgical History:   Procedure Laterality Date     ARTHROSCOPY KNEE Right 6/3/2021    Procedure: ARTHROSCOPY, KNEE, right medial and lateral  meniscal and chondral debridement;  Surgeon: Ulisses Hyatt MD;  Location: [facility] OR     SURGICAL HISTORY OF -   1990    Shaved heel bone right foot.       Social History     Tobacco Use     Smoking status: Never Smoker     Smokeless tobacco: Never Used     Tobacco comment: Nonsmoking household   Substance Use Topics     Alcohol use: Yes     Comment: Very very rarely     Family History   Problem Relation Age of Onset     Heart Disease Mother         MI before age 40. congenital defect.      Hypertension Father      Circulatory Maternal Grandfather         brain aneurysm     Cancer Paternal Grandfather         throat and mouth, smoker         No current outpatient medications on file.     Allergies   Allergen Reactions     Sulfa Drugs Rash     Recent Labs   Lab Test 01/18/17  0803   LDL 87   HDL 67   TRIG 41   TSH 1.32        Breast Cancer Screening:    Breast CA Risk Assessment (FHS-7) 6/27/2022   Do you have a family history of breast, colon, or ovarian cancer? No / Unknown         Mammogram Screening: Recommended annual mammography  Pertinent mammograms are reviewed under the imaging tab.    History of abnormal Pap smear:   NO - age 30-65 PAP every 5 years with negative  HPV co-testing recommended  Last 3 Pap and HPV Results:   PAP / HPV Latest Ref Rng & Units 2019 3/21/2016 2015   PAP (Historical) - NIL NIL LSIL(A)   HPV16 NEG:Negative Negative Negative -   HPV18 NEG:Negative Negative Negative -   HRHPV NEG:Negative Negative Negative -     PAP / HPV Latest Ref Rng & Units 2019 3/21/2016 2015   PAP (Historical) - NIL NIL LSIL(A)   HPV16 NEG:Negative Negative Negative -   HPV18 NEG:Negative Negative Negative -   HRHPV NEG:Negative Negative Negative -     Reviewed and updated as needed this visit by clinical staff   Tobacco  Allergies  Meds   Med Hx  Surg Hx  Fam Hx  Soc Hx          Reviewed and updated as needed this visit by Provider                   Past Medical History:   Diagnosis Date     H/O colposcopy with cervical biopsy 3/16/15    ECC - LSIL, Bx - NO 1     LSIL (low grade squamous intraepithelial lesion) on Pap smear 2/24/15    + HR HPV     Motion sickness       Past Surgical History:   Procedure Laterality Date     ARTHROSCOPY KNEE Right 6/3/2021    Procedure: ARTHROSCOPY, KNEE, right medial and lateral  meniscal and chondral debridement;  Surgeon: Ulisses Hyatt MD;  Location: [facility]     SURGICAL HISTORY OF -       Shaved heel bone right foot.     OB History    Para Term  AB Living   4 4 4 0 0 4   SAB IAB Ectopic Multiple Live Births   0 0 0 0 4      # Outcome Date GA Lbr Bjorn/2nd Weight Sex Delivery Anes PTL Lv   4 Term  40w0d       SALLY   3 Term  40w0d       SALLY   2 Term  40w0d       SALLY   1 Term  40w0d       SALLY       Review of Systems   Constitutional: Negative for chills and fever.   HENT: Negative for congestion, ear pain, hearing loss and sore throat.    Eyes: Negative for pain and visual disturbance.   Respiratory: Negative for cough and shortness of breath.    Cardiovascular: Negative for chest pain, palpitations and peripheral edema.   Gastrointestinal: Negative for abdominal pain,  "constipation, diarrhea, heartburn, hematochezia and nausea.   Genitourinary: Negative for dysuria, frequency, genital sores, hematuria and urgency.   Musculoskeletal: Positive for arthralgias and joint swelling. Negative for myalgias.   Skin: Negative for rash.   Neurological: Negative for dizziness, weakness, headaches and paresthesias.   Psychiatric/Behavioral: Negative for mood changes. The patient is not nervous/anxious.           OBJECTIVE:   /81   Pulse 95   Temp 97.8  F (36.6  C) (Tympanic)   Ht 1.67 m (5' 5.75\")   Wt 75.3 kg (166 lb)   LMP 06/13/2022   SpO2 98%   BMI 27.00 kg/m    Physical Exam  GENERAL: healthy, alert and no distress  EYES: Eyes grossly normal to inspection, PERRL and conjunctivae and sclerae normal  HENT: ear canals and TM's normal, nose and mouth without ulcers or lesions  NECK: no adenopathy, no asymmetry, masses, or scars and thyroid normal to palpation  RESP: lungs clear to auscultation - no rales, rhonchi or wheezes  BREAST: normal without masses, tenderness or nipple discharge and no palpable axillary masses or adenopathy  CV: regular rate and rhythm, normal S1 S2, no S3 or S4, no murmur, click or rub, no peripheral edema and peripheral pulses strong  ABDOMEN: soft, nontender, no hepatosplenomegaly, no masses and bowel sounds normal   (female): normal female external genitalia, normal urethral meatus, vaginal mucosa pink, moist, well rugated, and normal cervix/adnexa/uterus without masses or discharge  MS: no gross musculoskeletal defects noted, no edema  SKIN: no suspicious lesions or rashes  NEURO: Normal strength and tone, mentation intact and speech normal  PSYCH: mentation appears normal, affect normal/bright    Diagnostic Test Results:  Labs reviewed in Epic    ASSESSMENT/PLAN:   (Z00.00) Routine general medical examination at a health care facility  (primary encounter diagnosis)  Comment: Health maintenance reviewed and updated.  If pap is normal / HPV negative " "plan 5 year screening  Plan: PAP screen with HPV - recommended age 30 - 65         years, Lipid panel reflex to direct LDL         Fasting, TDAP VACCINE (Adacel, Boostrix),         Glucose            (Z13.220) Screening for hyperlipidemia  Fasting lab appointment scheduled.  Plan: Lipid panel reflex to direct LDL Fasting            (Z83.49) Family history of thyroid disorder  Plan: TSH with free T4 reflex              Patient has been advised of split billing requirements and indicates understanding: Yes    COUNSELING:  Reviewed preventive health counseling, as reflected in patient instructions       Regular exercise       Healthy diet/nutrition    Estimated body mass index is 27 kg/m  as calculated from the following:    Height as of this encounter: 1.67 m (5' 5.75\").    Weight as of this encounter: 75.3 kg (166 lb).    Weight management plan: Discussed healthy diet and exercise guidelines    She reports that she has never smoked. She has never used smokeless tobacco.      Counseling Resources:  ATP IV Guidelines  Pooled Cohorts Equation Calculator  Breast Cancer Risk Calculator  BRCA-Related Cancer Risk Assessment: FHS-7 Tool  FRAX Risk Assessment  ICSI Preventive Guidelines  Dietary Guidelines for Americans, 2010  USDA's MyPlate  ASA Prophylaxis  Lung CA Screening    Kristen M. Kehr, PA-C [facility]  "

## 2022-06-27 NOTE — NURSING NOTE
"Chief Complaint   Patient presents with     Physical       Initial /81   Pulse 95   Temp 97.8  F (36.6  C) (Tympanic)   Ht 1.67 m (5' 5.75\")   Wt 75.3 kg (166 lb)   LMP 06/13/2022   SpO2 98%   BMI 27.00 kg/m   Estimated body mass index is 27 kg/m  as calculated from the following:    Height as of this encounter: 1.67 m (5' 5.75\").    Weight as of this encounter: 75.3 kg (166 lb).  Medication Reconciliation: complete    TALIB Toney MA    "

## 2022-06-27 NOTE — PATIENT INSTRUCTIONS
SCHEDULE FASTING LAB APPOINTMENT.     CHECK COLON SCREENING                       Preventive Health Recommendations  Female Ages 40 to 49    Yearly exam:   See your health care provider every year in order to  Review health changes.   Discuss preventive care.    Review your medicines if your doctor prescribed any.    Get a Pap test every three years (unless you have an abnormal result and your provider advises testing more often).    If you get Pap tests with HPV test, you only need to test every 5 years, unless you have an abnormal result. You do not need a Pap test if your uterus was removed (hysterectomy) and you have not had cancer.    You should be tested each year for STDs (sexually transmitted diseases), if you're at risk.   Ask your doctor if you should have a mammogram.    Have a colonoscopy (test for colon cancer) if someone in your family has had colon cancer or polyps before age 50.     Have a cholesterol test every 5 years.     Have a diabetes test (fasting glucose) after age 45. If you are at risk for diabetes, you should have this test every 3 years.    Shots: Get a flu shot each year. Get a tetanus shot every 10 years.     Nutrition:   Eat at least 5 servings of fruits and vegetables each day.  Eat whole-grain bread, whole-wheat pasta and brown rice instead of white grains and rice.  Get adequate Calcium and Vitamin D.      Lifestyle  Exercise at least 150 minutes a week (an average of 30 minutes a day, 5 days a week). This will help you control your weight and prevent disease.  Limit alcohol to one drink per day.  No smoking.   Wear sunscreen to prevent skin cancer.  See your dentist every six months for an exam and cleaning.

## 2022-06-30 LAB
BKR LAB AP GYN ADEQUACY: ABNORMAL
BKR LAB AP GYN INTERPRETATION: ABNORMAL
BKR LAB AP HPV REFLEX: ABNORMAL
BKR LAB AP PREVIOUS ABNORMAL: ABNORMAL
PATH REPORT.COMMENTS IMP SPEC: ABNORMAL
PATH REPORT.COMMENTS IMP SPEC: ABNORMAL
PATH REPORT.RELEVANT HX SPEC: ABNORMAL

## 2022-07-01 LAB
HUMAN PAPILLOMA VIRUS 16 DNA: NEGATIVE
HUMAN PAPILLOMA VIRUS 18 DNA: NEGATIVE
HUMAN PAPILLOMA VIRUS FINAL DIAGNOSIS: NORMAL
HUMAN PAPILLOMA VIRUS OTHER HR: NEGATIVE

## 2022-07-27 ENCOUNTER — LAB (OUTPATIENT)
Dept: LAB | Facility: CLINIC | Age: 47
End: 2022-07-27
Payer: COMMERCIAL

## 2022-07-27 ENCOUNTER — TRANSFERRED RECORDS (OUTPATIENT)
Dept: HEALTH INFORMATION MANAGEMENT | Facility: CLINIC | Age: 47
End: 2022-07-27

## 2022-07-27 DIAGNOSIS — Z13.220 SCREENING FOR HYPERLIPIDEMIA: ICD-10-CM

## 2022-07-27 DIAGNOSIS — Z00.00 ROUTINE GENERAL MEDICAL EXAMINATION AT A HEALTH CARE FACILITY: ICD-10-CM

## 2022-07-27 DIAGNOSIS — Z83.49 FAMILY HISTORY OF THYROID DISORDER: ICD-10-CM

## 2022-07-27 LAB
CHOLEST SERPL-MCNC: 195 MG/DL
FASTING STATUS PATIENT QL REPORTED: YES
FASTING STATUS PATIENT QL REPORTED: YES
GLUCOSE BLD-MCNC: 97 MG/DL (ref 70–99)
HDLC SERPL-MCNC: 59 MG/DL
LDLC SERPL CALC-MCNC: 123 MG/DL
NONHDLC SERPL-MCNC: 136 MG/DL
TRIGL SERPL-MCNC: 65 MG/DL
TSH SERPL DL<=0.005 MIU/L-ACNC: 1.54 MU/L (ref 0.4–4)

## 2022-07-27 PROCEDURE — 84443 ASSAY THYROID STIM HORMONE: CPT

## 2022-07-27 PROCEDURE — 82947 ASSAY GLUCOSE BLOOD QUANT: CPT

## 2022-07-27 PROCEDURE — 36415 COLL VENOUS BLD VENIPUNCTURE: CPT

## 2022-07-27 PROCEDURE — 80061 LIPID PANEL: CPT

## 2022-09-13 ENCOUNTER — ANCILLARY PROCEDURE (OUTPATIENT)
Dept: MAMMOGRAPHY | Facility: CLINIC | Age: 47
End: 2022-09-13
Attending: NURSE PRACTITIONER
Payer: COMMERCIAL

## 2022-09-13 DIAGNOSIS — Z12.31 VISIT FOR SCREENING MAMMOGRAM: ICD-10-CM

## 2022-09-13 PROCEDURE — 77067 SCR MAMMO BI INCL CAD: CPT | Mod: TC | Performed by: RADIOLOGY

## 2022-11-02 ENCOUNTER — TRANSFERRED RECORDS (OUTPATIENT)
Dept: HEALTH INFORMATION MANAGEMENT | Facility: CLINIC | Age: 47
End: 2022-11-02

## 2023-02-13 ENCOUNTER — OFFICE VISIT (OUTPATIENT)
Dept: FAMILY MEDICINE | Facility: CLINIC | Age: 48
End: 2023-02-13
Payer: COMMERCIAL

## 2023-02-13 VITALS
TEMPERATURE: 98.3 F | RESPIRATION RATE: 16 BRPM | DIASTOLIC BLOOD PRESSURE: 80 MMHG | HEART RATE: 84 BPM | SYSTOLIC BLOOD PRESSURE: 122 MMHG | OXYGEN SATURATION: 99 % | BODY MASS INDEX: 26.84 KG/M2 | HEIGHT: 67 IN | WEIGHT: 171 LBS

## 2023-02-13 DIAGNOSIS — Z01.818 PREOP GENERAL PHYSICAL EXAM: Primary | ICD-10-CM

## 2023-02-13 DIAGNOSIS — G89.29 CHRONIC PAIN OF RIGHT KNEE: ICD-10-CM

## 2023-02-13 DIAGNOSIS — M25.561 CHRONIC PAIN OF RIGHT KNEE: ICD-10-CM

## 2023-02-13 LAB
ANION GAP SERPL CALCULATED.3IONS-SCNC: 3 MMOL/L (ref 3–14)
BASOPHILS # BLD AUTO: 0 10E3/UL (ref 0–0.2)
BASOPHILS NFR BLD AUTO: 0 %
BUN SERPL-MCNC: 11 MG/DL (ref 7–30)
CALCIUM SERPL-MCNC: 9.2 MG/DL (ref 8.5–10.1)
CHLORIDE BLD-SCNC: 103 MMOL/L (ref 94–109)
CO2 SERPL-SCNC: 32 MMOL/L (ref 20–32)
CREAT SERPL-MCNC: 0.67 MG/DL (ref 0.52–1.04)
EOSINOPHIL # BLD AUTO: 0.1 10E3/UL (ref 0–0.7)
EOSINOPHIL NFR BLD AUTO: 1 %
ERYTHROCYTE [DISTWIDTH] IN BLOOD BY AUTOMATED COUNT: 12.4 % (ref 10–15)
GFR SERPL CREATININE-BSD FRML MDRD: >90 ML/MIN/1.73M2
GLUCOSE BLD-MCNC: 101 MG/DL (ref 70–99)
HCT VFR BLD AUTO: 40.1 % (ref 35–47)
HGB BLD-MCNC: 13.3 G/DL (ref 11.7–15.7)
LYMPHOCYTES # BLD AUTO: 2.5 10E3/UL (ref 0.8–5.3)
LYMPHOCYTES NFR BLD AUTO: 38 %
MCH RBC QN AUTO: 29.4 PG (ref 26.5–33)
MCHC RBC AUTO-ENTMCNC: 33.2 G/DL (ref 31.5–36.5)
MCV RBC AUTO: 89 FL (ref 78–100)
MONOCYTES # BLD AUTO: 0.6 10E3/UL (ref 0–1.3)
MONOCYTES NFR BLD AUTO: 8 %
NEUTROPHILS # BLD AUTO: 3.5 10E3/UL (ref 1.6–8.3)
NEUTROPHILS NFR BLD AUTO: 52 %
PLATELET # BLD AUTO: 272 10E3/UL (ref 150–450)
POTASSIUM BLD-SCNC: 4.1 MMOL/L (ref 3.4–5.3)
RBC # BLD AUTO: 4.52 10E6/UL (ref 3.8–5.2)
SODIUM SERPL-SCNC: 138 MMOL/L (ref 133–144)
WBC # BLD AUTO: 6.6 10E3/UL (ref 4–11)

## 2023-02-13 PROCEDURE — 80048 BASIC METABOLIC PNL TOTAL CA: CPT | Performed by: PHYSICIAN ASSISTANT

## 2023-02-13 PROCEDURE — 99214 OFFICE O/P EST MOD 30 MIN: CPT | Performed by: PHYSICIAN ASSISTANT

## 2023-02-13 PROCEDURE — 36415 COLL VENOUS BLD VENIPUNCTURE: CPT | Performed by: PHYSICIAN ASSISTANT

## 2023-02-13 PROCEDURE — 85025 COMPLETE CBC W/AUTO DIFF WBC: CPT | Performed by: PHYSICIAN ASSISTANT

## 2023-02-13 RX ORDER — CELECOXIB 200 MG/1
200 CAPSULE ORAL
COMMUNITY
Start: 2022-11-02

## 2023-02-13 ASSESSMENT — PAIN SCALES - GENERAL: PAINLEVEL: MODERATE PAIN (4)

## 2023-02-13 NOTE — NURSING NOTE
"Chief Complaint   Patient presents with     Pre-Op Exam       Initial /80   Pulse 84   Temp 98.3  F (36.8  C) (Tympanic)   Resp 16   Ht 1.708 m (5' 7.25\")   Wt 77.6 kg (171 lb)   SpO2 99%   BMI 26.58 kg/m   Estimated body mass index is 26.58 kg/m  as calculated from the following:    Height as of this encounter: 1.708 m (5' 7.25\").    Weight as of this encounter: 77.6 kg (171 lb).  Medication Reconciliation: complete    TALIB Toney MA    "

## 2023-02-13 NOTE — PROGRESS NOTES
[facility]  [address] [address] [address]  Phone: [phone]  Primary Provider: Hiral Whipple  Pre-op Performing Provider: KEHR, KRISTEN M      PREOPERATIVE EVALUATION:  Today's date: 2/13/2023    Brittni Massey is a 47 year old female who presents for a preoperative evaluation.    Surgical Information:  Surgery/Procedure: right knee surgery- right knee medial unicompartmental arthroplasty  Surgery Location: [facility]  Surgeon: Dr. Stack  Surgery Date: 03/08/23  Time of Surgery: TBD  Where patient plans to recover: At home with family  Fax number for surgical facility: [phone]    Type of Anesthesia Anticipated: to be determined    Assessment & Plan     The proposed surgical procedure is considered LOW risk.    Preop general physical exam  Chronic pain of right knee  Chronic pain secondary to the arthritis caused by previous meniscal surgeries and damage from injury.   - CBC with platelets and differential; Future  - Basic metabolic panel  (Ca, Cl, CO2, Creat, Gluc, K, Na, BUN); Future           Risks and Recommendations:  The patient has the following additional risks and recommendations for perioperative complications:   - No identified additional risk factors other than previously addressed    Medication Instructions:  She will hold celebrex 5-7 days prior to surgery    RECOMMENDATION:  APPROVAL GIVEN to proceed with proposed procedure, without further diagnostic evaluation.            Subjective     HPI related to upcoming procedure: Brittni has chronic pain in her right knee and limited ROM due to arthritis caused by trauma and surgeries.       Preop Questions 2/13/2023   1. Have you ever had a heart attack or stroke? No   2. Have you ever had surgery on your heart or blood vessels, such as a stent placement, a coronary artery bypass, or surgery on an artery in your head, neck, heart, or legs? No   3. Do you have chest pain with activity? No   4.  Do you have a history of  heart failure? No   5. Do you currently have a cold, bronchitis or symptoms of other infection? No   6. Do you have a cough, shortness of breath, or wheezing? No   7. Do you or anyone in your family have previous history of blood clots? No   8. Do you or does anyone in your family have a serious bleeding problem such as prolonged bleeding following surgeries or cuts? No   9. Have you ever had problems with anemia or been told to take iron pills? No   10. Have you had any abnormal blood loss such as black, tarry or bloody stools, or abnormal vaginal bleeding? No   11. Have you ever had a blood transfusion? No   12. Are you willing to have a blood transfusion if it is medically needed before, during, or after your surgery? Yes   13. Have you or any of your relatives ever had problems with anesthesia? No   14. Do you have sleep apnea, excessive snoring or daytime drowsiness? No   15. Do you have any artifical heart valves or other implanted medical devices like a pacemaker, defibrillator, or continuous glucose monitor? No   16. Do you have artificial joints? No   17. Are you allergic to latex? No   18. Is there any chance that you may be pregnant? No     Health Care Directive:  Patient does not have a Health Care Directive or Living Will: Discussed advance care planning with patient; however, patient declined at this time.    Preoperative Review of :   reviewed - no record of controlled substances prescribed.      Status of Chronic Conditions:  Chronic knee pain    Review of Systems  Constitutional, neuro, ENT, endocrine, pulmonary, cardiac, gastrointestinal, genitourinary, musculoskeletal, integument and psychiatric systems are negative, except as otherwise noted.    Patient Active Problem List    Diagnosis Date Noted     Primary osteoarthritis of right knee 03/15/2022     Priority: Medium     Added automatically from request for surgery [id number]       Tear of medial meniscus of right  "knee, current, unspecified tear type, subsequent encounter 04/19/2021     Priority: Medium     Added automatically from request for surgery [id number]       Tear of lateral meniscus of right knee, current, unspecified tear type, subsequent encounter 04/19/2021     Priority: Medium     Added automatically from request for surgery [id number]       Dysmenorrhea 04/17/2012     Priority: Medium     CARDIOVASCULAR SCREENING; LDL GOAL LESS THAN 160 10/31/2010     Priority: Medium      Past Medical History:   Diagnosis Date     H/O colposcopy with cervical biopsy 3/16/15    ECC - LSIL, Bx - NO 1     LSIL (low grade squamous intraepithelial lesion) on Pap smear 2/24/15    + HR HPV     Motion sickness      Past Surgical History:   Procedure Laterality Date     ARTHROSCOPY KNEE Right 6/3/2021    Procedure: ARTHROSCOPY, KNEE, right medial and lateral  meniscal and chondral debridement;  Surgeon: Ulisses Hyatt MD;  Location: [facility] OR     SURGICAL HISTORY OF -   1990    Shaved heel bone right foot.     Current Outpatient Medications   Medication Sig Dispense Refill     celecoxib (CELEBREX) 200 MG capsule Take 200 mg by mouth PRN         Allergies   Allergen Reactions     Sulfa Drugs Rash        Social History     Tobacco Use     Smoking status: Never     Smokeless tobacco: Never     Tobacco comments:     Nonsmoking household   Substance Use Topics     Alcohol use: Yes     Comment: Very very rarely     Family History   Problem Relation Age of Onset     Heart Disease Mother         MI before age 40. congenital defect.      Hypertension Father      Circulatory Maternal Grandfather         brain aneurysm     Cancer Paternal Grandfather         throat and mouth, smoker     History   Drug Use No         Objective     /80   Pulse 84   Temp 98.3  F (36.8  C) (Tympanic)   Resp 16   Ht 1.708 m (5' 7.25\")   Wt 77.6 kg (171 lb)   SpO2 99%   BMI 26.58 kg/m      Physical Exam    GENERAL APPEARANCE: healthy, alert and no distress     " EYES: EOMI, PERRL     HENT: ear canals and TM's normal and nose and mouth without ulcers or lesions     NECK: no adenopathy, no asymmetry, masses, or scars and thyroid normal to palpation     RESP: lungs clear to auscultation - no rales, rhonchi or wheezes     CV: regular rates and rhythm, normal S1 S2, no S3 or S4 and no murmur, click or rub     ABDOMEN:  soft, nontender, no HSM or masses and bowel sounds normal     MS: extremities normal- no gross deformities noted, no evidence of inflammation in joints, FROM in all extremities.     SKIN: no suspicious lesions or rashes     NEURO: Normal strength and tone, sensory exam grossly normal, mentation intact and speech normal     PSYCH: mentation appears normal. and affect normal/bright     LYMPHATICS: No cervical adenopathy    Recent Labs   Lab Test 05/17/21  1340   HGB 13.4        Diagnostics:  Recent Results (from the past 24 hour(s))   Basic metabolic panel  (Ca, Cl, CO2, Creat, Gluc, K, Na, BUN)    Collection Time: 02/13/23 11:50 AM   Result Value Ref Range    Sodium 138 133 - 144 mmol/L    Potassium 4.1 3.4 - 5.3 mmol/L    Chloride 103 94 - 109 mmol/L    Carbon Dioxide (CO2) 32 20 - 32 mmol/L    Anion Gap 3 3 - 14 mmol/L    Urea Nitrogen 11 7 - 30 mg/dL    Creatinine 0.67 0.52 - 1.04 mg/dL    Calcium 9.2 8.5 - 10.1 mg/dL    Glucose 101 (H) 70 - 99 mg/dL    GFR Estimate >90 >60 mL/min/1.73m2   CBC with platelets and differential    Collection Time: 02/13/23 11:50 AM   Result Value Ref Range    WBC Count 6.6 4.0 - 11.0 10e3/uL    RBC Count 4.52 3.80 - 5.20 10e6/uL    Hemoglobin 13.3 11.7 - 15.7 g/dL    Hematocrit 40.1 35.0 - 47.0 %    MCV 89 78 - 100 fL    MCH 29.4 26.5 - 33.0 pg    MCHC 33.2 31.5 - 36.5 g/dL    RDW 12.4 10.0 - 15.0 %    Platelet Count 272 150 - 450 10e3/uL    % Neutrophils 52 %    % Lymphocytes 38 %    % Monocytes 8 %    % Eosinophils 1 %    % Basophils 0 %    Absolute Neutrophils 3.5 1.6 - 8.3 10e3/uL    Absolute Lymphocytes 2.5 0.8 - 5.3 10e3/uL     Absolute Monocytes 0.6 0.0 - 1.3 10e3/uL    Absolute Eosinophils 0.1 0.0 - 0.7 10e3/uL    Absolute Basophils 0.0 0.0 - 0.2 10e3/uL          Revised Cardiac Risk Index (RCRI):  The patient has the following serious cardiovascular risks for perioperative complications:   - No serious cardiac risks = 0 points     RCRI Interpretation: 1 point: Class II (low risk - 0.9% complication rate)           Signed Electronically by: Kristen M. Kehr, PA-C  Copy of this evaluation report is provided to requesting physician.

## 2023-02-13 NOTE — PATIENT INSTRUCTIONS
For informational purposes only. Not to replace the advice of your health care provider. Copyright   2003,  [employer] [facility]. All rights reserved. Clinically reviewed by Rocio Eckert MD. [employer] [id number] - REV .  Preparing for Your Surgery  Getting started  A nurse will call you to review your health history and instructions. They will give you an arrival time based on your scheduled surgery time. Please be ready to share:    Your doctor's clinic name and phone number    Your medical, surgical, and anesthesia history    A list of allergies and sensitivities    A list of medicines, including herbal treatments and over-the-counter drugs    Whether the patient has a legal guardian (ask how to send us the papers in advance)  Please tell us if you're pregnant--or if there's any chance you might be pregnant. Some surgeries may injure a fetus (unborn baby), so they require a pregnancy test. Surgeries that are safe for a fetus don't always need a test, and you can choose whether to have one.   If you have a child who's having surgery, please ask for a copy of Preparing for Your Child's Surgery.    Preparing for surgery    Within 10 to 30 days of surgery: Have a pre-op exam (sometimes called an H&P, or History and Physical). This can be done at a clinic or pre-operative center.  ? If you're having a , you may not need this exam. Talk to your care team.    At your pre-op exam, talk to your care team about all medicines you take. If you need to stop any medicines before surgery, ask when to start taking them again.  ? We do this for your safety. Many medicines can make you bleed too much during surgery. Some change how well surgery (anesthesia) drugs work.    Call your insurance company to let them know you're having surgery. (If you don't have insurance, call [phone].)    Call your clinic if there's any change in your health. This includes signs of a cold or flu (sore throat, runny nose,  cough, rash, fever). It also includes a scrape or scratch near the surgery site.    If you have questions on the day of surgery, call your hospital or surgery center.  Eating and drinking guidelines  For your safety: Unless your surgeon tells you otherwise, follow the guidelines below.    Eat and drink as usual until 8 hours before you arrive for surgery. After that, no food or milk.    Drink clear liquids until 2 hours before you arrive. These are liquids you can see through, like water, Gatorade, and Propel Water. They also include plain black coffee and tea (no cream or milk), candy, and breath mints. You can spit out gum when you arrive.    If you drink alcohol: Stop drinking it the night before surgery.    If your care team tells you to take medicine on the morning of surgery, it's okay to take it with a sip of water.  Preventing infection    Shower or bathe the night before and morning of your surgery. Follow the instructions your clinic gave you. (If no instructions, use regular soap.)    Don't shave or clip hair near your surgery site. We'll remove the hair if needed.    Don't smoke or vape the morning of surgery. You may chew nicotine gum up to 2 hours before surgery. A nicotine patch is okay.  ? Note: Some surgeries require you to completely quit smoking and nicotine. Check with your surgeon.    Your care team will make every effort to keep you safe from infection. We will:  ? Clean our hands often with soap and water (or an alcohol-based hand rub).  ? Clean the skin at your surgery site with a special soap that kills germs.  ? Give you a special gown to keep you warm. (Cold raises the risk of infection.)  ? Wear special hair covers, masks, gowns and gloves during surgery.  ? Give antibiotic medicine, if prescribed. Not all surgeries need antibiotics.  What to bring on the day of surgery    Photo ID and insurance card    Copy of your health care directive, if you have one    Glasses and hearing aids (bring  cases)  ? You can't wear contacts during surgery    Inhaler and eye drops, if you use them (tell us about these when you arrive)    CPAP machine or breathing device, if you use them    A few personal items, if spending the night    If you have . . .  ? A pacemaker, ICD (cardiac defibrillator) or other implant: Bring the ID card.  ? An implanted stimulator: Bring the remote control.  ? A legal guardian: Bring a copy of the certified (court-stamped) guardianship papers.  Please remove any jewelry, including body piercings. Leave jewelry and other valuables at home.  If you're going home the day of surgery    You must have a responsible adult drive you home. They should stay with you overnight as well.    If you don't have someone to stay with you, and you aren't safe to go home alone, we may keep you overnight. Insurance often won't pay for this.  After surgery  If it's hard to control your pain or you need more pain medicine, please call your surgeon's office.  Questions?   If you have any questions for your care team, list them here: _________________________________________________________________________________________________________________________________________________________________________ ____________________________________ ____________________________________ ____________________________________

## 2023-03-08 ENCOUNTER — TRANSFERRED RECORDS (OUTPATIENT)
Dept: HEALTH INFORMATION MANAGEMENT | Facility: CLINIC | Age: 48
End: 2023-03-08

## 2023-03-22 ENCOUNTER — TRANSFERRED RECORDS (OUTPATIENT)
Dept: HEALTH INFORMATION MANAGEMENT | Facility: CLINIC | Age: 48
End: 2023-03-22

## 2023-04-19 ENCOUNTER — TRANSFERRED RECORDS (OUTPATIENT)
Dept: HEALTH INFORMATION MANAGEMENT | Facility: CLINIC | Age: 48
End: 2023-04-19
Payer: COMMERCIAL

## 2023-04-20 ENCOUNTER — OFFICE VISIT (OUTPATIENT)
Dept: FAMILY MEDICINE | Facility: CLINIC | Age: 48
End: 2023-04-20
Payer: COMMERCIAL

## 2023-04-20 VITALS
WEIGHT: 172 LBS | SYSTOLIC BLOOD PRESSURE: 136 MMHG | HEIGHT: 68 IN | OXYGEN SATURATION: 99 % | HEART RATE: 97 BPM | RESPIRATION RATE: 16 BRPM | TEMPERATURE: 97.7 F | BODY MASS INDEX: 26.07 KG/M2 | DIASTOLIC BLOOD PRESSURE: 81 MMHG

## 2023-04-20 DIAGNOSIS — Z96.651 STATUS POST TOTAL RIGHT KNEE REPLACEMENT: ICD-10-CM

## 2023-04-20 DIAGNOSIS — Z01.818 PREOP GENERAL PHYSICAL EXAM: Primary | ICD-10-CM

## 2023-04-20 PROCEDURE — 99214 OFFICE O/P EST MOD 30 MIN: CPT | Performed by: NURSE PRACTITIONER

## 2023-04-20 ASSESSMENT — PAIN SCALES - GENERAL: PAINLEVEL: NO PAIN (0)

## 2023-04-20 NOTE — PROGRESS NOTES
[address], [address]  [facility] [address]  Phone: [phone]  Primary Provider: Hiral Whipple  Pre-op Performing Provider: YASMEEN GOMEZ      PREOPERATIVE EVALUATION:  Today's date: 4/20/2023    Brittni Massey is a 48 year old female who presents for a preoperative evaluation.       View : No data to display.              Surgical Information:  Surgery/Procedure: right knee surgery  Surgery Location: [facility]  Surgeon: Dr. Stack  Surgery Date: 04/21/2023  Time of Surgery: 9AM  Where patient plans to recover: At home with family  Fax number for surgical facility: [phone]    Assessment & Plan     The proposed surgical procedure is considered INTERMEDIATE risk.    Problem List Items Addressed This Visit    None  Visit Diagnoses     Preop general physical exam    -  Primary    Status post total right knee replacement            Ok for surgery. No concerns today.          Risks and Recommendations:  The patient has the following additional risks and recommendations for perioperative complications:   - No identified additional risk factors other than previously addressed    Medication Instructions:  Do not take any NSAIDs    RECOMMENDATION:  APPROVAL GIVEN to proceed with proposed procedure, without further diagnostic evaluation.    Subjective     HPI related to upcoming procedure:   Had knee replacement 6 weeks ago, had follow-up appointment and has excessive scar tissue so procedure tomorrow 4/21 to fix that.   Has been feeling well.   No chest pain, SOB.   Knee surgery recovery had been going well         4/20/2023    10:42 AM   Preop Questions   1. Have you ever had a heart attack or stroke? No   2. Have you ever had surgery on your heart or blood vessels, such as a stent placement, a coronary artery bypass, or surgery on an artery in your head, neck, heart, or legs? No   3. Do you have chest pain with activity? No   4. Do  you have a history of  heart failure? No   5. Do you currently have a cold, bronchitis or symptoms of other infection? No   6. Do you have a cough, shortness of breath, or wheezing? No   7. Do you or anyone in your family have previous history of blood clots? No   8. Do you or does anyone in your family have a serious bleeding problem such as prolonged bleeding following surgeries or cuts? No   9. Have you ever had problems with anemia or been told to take iron pills? No   10. Have you had any abnormal blood loss such as black, tarry or bloody stools, or abnormal vaginal bleeding? No   11. Have you ever had a blood transfusion? No   12. Are you willing to have a blood transfusion if it is medically needed before, during, or after your surgery? Yes   13. Have you or any of your relatives ever had problems with anesthesia? YES - Post-operative nausea   14. Do you have sleep apnea, excessive snoring or daytime drowsiness? No   15. Do you have any artifical heart valves or other implanted medical devices like a pacemaker, defibrillator, or continuous glucose monitor? No   16. Do you have artificial joints? YES - R knee   17. Are you allergic to latex? No   18. Is there any chance that you may be pregnant? No       Health Care Directive:  Patient does not have a Health Care Directive or Living Will:     Preoperative Review of :   reviewed - historical short course      Status of Chronic Conditions:  See problem list for active medical problems.  Problems all longstanding and stable, except as noted/documented.  See ROS for pertinent symptoms related to these conditions.      Review of Systems  Constitutional, neuro, ENT, endocrine, pulmonary, cardiac, gastrointestinal, genitourinary, musculoskeletal, integument and psychiatric systems are negative, except as otherwise noted.    Patient Active Problem List    Diagnosis Date Noted     Primary osteoarthritis of right knee 03/15/2022     Priority: Medium     Added  automatically from request for surgery [id number]       Tear of medial meniscus of right knee, current, unspecified tear type, subsequent encounter 04/19/2021     Priority: Medium     Added automatically from request for surgery [id number]       Tear of lateral meniscus of right knee, current, unspecified tear type, subsequent encounter 04/19/2021     Priority: Medium     Added automatically from request for surgery [id number]       Dysmenorrhea 04/17/2012     Priority: Medium     CARDIOVASCULAR SCREENING; LDL GOAL LESS THAN 160 10/31/2010     Priority: Medium      Past Medical History:   Diagnosis Date     H/O colposcopy with cervical biopsy 3/16/15    ECC - LSIL, Bx - NO 1     LSIL (low grade squamous intraepithelial lesion) on Pap smear 2/24/15    + HR HPV     Motion sickness      Past Surgical History:   Procedure Laterality Date     ARTHROSCOPY KNEE Right 6/3/2021    Procedure: ARTHROSCOPY, KNEE, right medial and lateral  meniscal and chondral debridement;  Surgeon: Ulisses Hyatt MD;  Location: [facility] OR     SURGICAL HISTORY OF -   1990    Shaved heel bone right foot.     Current Outpatient Medications   Medication Sig Dispense Refill     celecoxib (CELEBREX) 200 MG capsule Take 200 mg by mouth PRN         Allergies   Allergen Reactions     Sulfa Drugs Rash        Social History     Tobacco Use     Smoking status: Never     Smokeless tobacco: Never     Tobacco comments:     Nonsmoking household   Vaping Use     Vaping status: Never Used   Substance Use Topics     Alcohol use: Yes     Comment: Very very rarely     Family History   Problem Relation Age of Onset     Heart Disease Mother         MI before age 40. congenital defect.      Hypertension Father      Circulatory Maternal Grandfather         brain aneurysm     Cancer Paternal Grandfather         throat and mouth, smoker     History   Drug Use No         Objective     /81 (BP Location: Right arm, Patient Position: Sitting, Cuff Size: Adult Regular)   Pulse  "97   Temp 97.7  F (36.5  C) (Temporal)   Resp 16   Ht 1.72 m (5' 7.72\")   Wt 78 kg (172 lb)   SpO2 99%   BMI 26.37 kg/m      Physical Exam    GENERAL APPEARANCE: healthy, alert and no distress     EYES: EOMI, PERRL     RESP: lungs clear to auscultation - no rales, rhonchi or wheezes     CV: regular rates and rhythm, normal S1 S2, no S3 or S4 and no murmur, click or rub     MS: extremities normal- no gross deformities noted, no evidence of inflammation in joints, FROM in all extremities.     SKIN: no suspicious lesions or rashes     NEURO: Normal strength and tone, sensory exam grossly normal, mentation intact and speech normal     PSYCH: mentation appears normal. and affect normal/bright    Recent Labs   Lab Test 02/13/23  1150 05/17/21  1340   HGB 13.3 13.4     --      --    POTASSIUM 4.1  --    CR 0.67  --         Diagnostics:  No labs were ordered during this visit.   No EKG required, no history of coronary heart disease, significant arrhythmia, peripheral arterial disease or other structural heart disease.    Revised Cardiac Risk Index (RCRI):  The patient has the following serious cardiovascular risks for perioperative complications:   - No serious cardiac risks = 0 points     RCRI Interpretation: 0 points: Class I (very low risk - 0.4% complication rate)           Signed Electronically by: SLICK Gil CNP  Copy of this evaluation report is provided to requesting physician.      I saw this patient in collaboration with Juana Rodriguez      I was present with the SLICK/PA student who participated in the service and in the documentation of the services provided. I have verified the history and personally performed the physical exam and medical decision making, as documented by the student and edited by me.     SLICK Ferraro, CNP    Juana Rodriguez NP Student      "

## 2023-04-20 NOTE — PATIENT INSTRUCTIONS
For informational purposes only. Not to replace the advice of your health care provider. Copyright   2003,  [employer] [facility]. All rights reserved. Clinically reviewed by Rocio Eckert MD. [employer] [id number] - REV .  Preparing for Your Surgery  Getting started  A nurse will call you to review your health history and instructions. They will give you an arrival time based on your scheduled surgery time. Please be ready to share:    Your doctor's clinic name and phone number    Your medical, surgical, and anesthesia history    A list of allergies and sensitivities    A list of medicines, including herbal treatments and over-the-counter drugs    Whether the patient has a legal guardian (ask how to send us the papers in advance)  Please tell us if you're pregnant--or if there's any chance you might be pregnant. Some surgeries may injure a fetus (unborn baby), so they require a pregnancy test. Surgeries that are safe for a fetus don't always need a test, and you can choose whether to have one.   If you have a child who's having surgery, please ask for a copy of Preparing for Your Child's Surgery.    Preparing for surgery    Within 10 to 30 days of surgery: Have a pre-op exam (sometimes called an H&P, or History and Physical). This can be done at a clinic or pre-operative center.  ? If you're having a , you may not need this exam. Talk to your care team.    At your pre-op exam, talk to your care team about all medicines you take. If you need to stop any medicines before surgery, ask when to start taking them again.  ? We do this for your safety. Many medicines can make you bleed too much during surgery. Some change how well surgery (anesthesia) drugs work.    Call your insurance company to let them know you're having surgery. (If you don't have insurance, call [phone].)    Call your clinic if there's any change in your health. This includes signs of a cold or flu (sore throat, runny nose,  cough, rash, fever). It also includes a scrape or scratch near the surgery site.    If you have questions on the day of surgery, call your hospital or surgery center.  Eating and drinking guidelines  For your safety: Unless your surgeon tells you otherwise, follow the guidelines below.    Eat and drink as usual until 8 hours before you arrive for surgery. After that, no food or milk.    Drink clear liquids until 2 hours before you arrive. These are liquids you can see through, like water, Gatorade, and Propel Water. They also include plain black coffee and tea (no cream or milk), candy, and breath mints. You can spit out gum when you arrive.    If you drink alcohol: Stop drinking it the night before surgery.    If your care team tells you to take medicine on the morning of surgery, it's okay to take it with a sip of water.  Preventing infection    Shower or bathe the night before and morning of your surgery. Follow the instructions your clinic gave you. (If no instructions, use regular soap.)    Don't shave or clip hair near your surgery site. We'll remove the hair if needed.    Don't smoke or vape the morning of surgery. You may chew nicotine gum up to 2 hours before surgery. A nicotine patch is okay.  ? Note: Some surgeries require you to completely quit smoking and nicotine. Check with your surgeon.    Your care team will make every effort to keep you safe from infection. We will:  ? Clean our hands often with soap and water (or an alcohol-based hand rub).  ? Clean the skin at your surgery site with a special soap that kills germs.  ? Give you a special gown to keep you warm. (Cold raises the risk of infection.)  ? Wear special hair covers, masks, gowns and gloves during surgery.  ? Give antibiotic medicine, if prescribed. Not all surgeries need antibiotics.  What to bring on the day of surgery    Photo ID and insurance card    Copy of your health care directive, if you have one    Glasses and hearing aids (bring  cases)  ? You can't wear contacts during surgery    Inhaler and eye drops, if you use them (tell us about these when you arrive)    CPAP machine or breathing device, if you use them    A few personal items, if spending the night    If you have . . .  ? A pacemaker, ICD (cardiac defibrillator) or other implant: Bring the ID card.  ? An implanted stimulator: Bring the remote control.  ? A legal guardian: Bring a copy of the certified (court-stamped) guardianship papers.  Please remove any jewelry, including body piercings. Leave jewelry and other valuables at home.  If you're going home the day of surgery    You must have a responsible adult drive you home. They should stay with you overnight as well.    If you don't have someone to stay with you, and you aren't safe to go home alone, we may keep you overnight. Insurance often won't pay for this.  After surgery  If it's hard to control your pain or you need more pain medicine, please call your surgeon's office.  Questions?   If you have any questions for your care team, list them here: _________________________________________________________________________________________________________________________________________________________________________ ____________________________________ ____________________________________ ____________________________________

## 2023-05-03 ENCOUNTER — TRANSFERRED RECORDS (OUTPATIENT)
Dept: HEALTH INFORMATION MANAGEMENT | Facility: CLINIC | Age: 48
End: 2023-05-03
Payer: COMMERCIAL

## 2023-09-23 ENCOUNTER — HEALTH MAINTENANCE LETTER (OUTPATIENT)
Age: 48
End: 2023-09-23

## 2023-12-02 ENCOUNTER — HEALTH MAINTENANCE LETTER (OUTPATIENT)
Age: 48
End: 2023-12-02

## 2024-06-25 ENCOUNTER — TRANSFERRED RECORDS (OUTPATIENT)
Dept: HEALTH INFORMATION MANAGEMENT | Facility: CLINIC | Age: 49
End: 2024-06-25
Payer: COMMERCIAL

## 2024-07-15 ENCOUNTER — TRANSFERRED RECORDS (OUTPATIENT)
Dept: HEALTH INFORMATION MANAGEMENT | Facility: CLINIC | Age: 49
End: 2024-07-15
Payer: COMMERCIAL

## 2024-08-07 ENCOUNTER — PRE VISIT (OUTPATIENT)
Dept: UROLOGY | Facility: CLINIC | Age: 49
End: 2024-08-07
Payer: COMMERCIAL

## 2024-08-07 NOTE — TELEPHONE ENCOUNTER
Reason for visit: Bladder prolapse     Relevant information: self scheduled, history of dysmenorrhea    Records/imaging/labs/orders: all records available    Pt called: No need for a call    At Rooming: Ask if a cathed PVR is required. Do not ask patient to undress for exam before Dr. Jay meets the patient.     Lulu Conklin  8/7/2024  1:01 PM

## 2024-08-14 ENCOUNTER — E-VISIT (OUTPATIENT)
Dept: URGENT CARE | Facility: CLINIC | Age: 49
End: 2024-08-14
Payer: COMMERCIAL

## 2024-08-14 DIAGNOSIS — N89.8 VAGINAL DISCHARGE: Primary | ICD-10-CM

## 2024-08-14 PROCEDURE — 99421 OL DIG E/M SVC 5-10 MIN: CPT | Performed by: PHYSICIAN ASSISTANT

## 2024-08-14 RX ORDER — FLUCONAZOLE 150 MG/1
150 TABLET ORAL ONCE
Qty: 1 TABLET | Refills: 0 | Status: SHIPPED | OUTPATIENT
Start: 2024-08-14 | End: 2024-08-14

## 2024-08-14 NOTE — PATIENT INSTRUCTIONS
Thank you for choosing us for your care. I have placed an order for a prescription so that you can start treatment. View your full visit summary for details by clicking on the link below. Your pharmacist will able to address any questions you may have about the medication.     If you re not feeling better within 2-3 days, please schedule an appointment.  You can schedule an appointment right here in [facility], or call [phone]  If the visit is for the same symptoms as your eVisit, we ll refund the cost of your eVisit if seen within seven days.    Vaginal Yeast Infection: Care Instructions  Overview     A vaginal yeast infection is the growth of too many yeast cells in the vagina. This is a common problem. Itching, vaginal discharge and irritation, and other symptoms can bother you. But yeast infections don't often cause other health problems.  Some medicines can increase your risk of getting a yeast infection. These include antibiotics, hormones, and steroids. You may also be more likely to get a yeast infection if you are pregnant, have diabetes, douche, or wear tight clothes.  With treatment, most yeast infections get better in a few days.  Follow-up care is a key part of your treatment and safety. Be sure to make and go to all appointments, and call your doctor if you are having problems. It's also a good idea to know your test results and keep a list of the medicines you take.  How can you care for yourself at home?  Take your medicines exactly as prescribed. Call your doctor if you think you are having a problem with your medicine.  Ask your doctor about over-the-counter (OTC) medicines for yeast infections. If you use an OTC treatment, read and follow all instructions on the label.  Don't use tampons while using a vaginal cream or suppository. The tampons can absorb the medicine. Use pads instead.  Wear loose cotton clothing. Don't wear nylon or other fabric that holds body heat and moisture close to the  "skin.  Try sleeping without underwear.  Don't scratch. Relieve itching with a cold pack or a cool bath.  Don't wash your vulva more than once a day. Use plain water or a mild, unscented soap. Air-dry the vulva.  Change out of wet or damp clothes as soon as possible.  If you are using a vaginal medicine, don't have sex until you have finished your treatment. But if you do have sex, don't depend on a condom or diaphragm for birth control. The oil in some vaginal medicines weakens latex.  Don't douche or use powders, sprays, or perfumes in your vagina or on your vulva. These items can change the normal balance of organisms in your vagina.  When should you call for help?   Call your doctor now or seek immediate medical care if:    You have new or increased pain in your vagina or pelvis.   Watch closely for changes in your health, and be sure to contact your doctor if:    You have unexpected vaginal bleeding.     You have a fever.     You are not getting better after 2 days.     Your symptoms come back after you finish your medicines.   Where can you learn more?  Go to https://www.[employer]/patiented  Enter F639 in the search box to learn more about \"Vaginal Yeast Infection: Care Instructions.\"  Current as of: November 27, 2023               Content Version: 14.0    3698-7480 [employer].   Care instructions adapted under license by your healthcare professional. If you have questions about a medical condition or this instruction, always ask your healthcare professional. [employer] disclaims any warranty or liability for your use of this information.    I think it is reasonable to treat with fluconazole today given your recent antibiotic use.  However, if symptoms do not improve within the next 2 to 3 days, would recommend presenting to urgent care or primary care for further evaluation and management.      "

## 2024-09-25 NOTE — TELEPHONE ENCOUNTER
MEDICAL RECORDS REQUEST   [facility]  [facility]rology Clinic  [address] [place] [address]  PHONE: [phone]  Fax: [phone]        FUTURE VISIT INFORMATION                                                   Brittni Massey, : 1975 scheduled for future visit at [facility]    APPOINTMENT INFORMATION:  Date: 2024  Provider:  Lala Jay MD  Reason for Visit/Diagnosis: Bladder Prolapse    RECORDS REQUESTED FOR VISIT                                                     NOTES  STATUS/DETAILS   OFFICE NOTE from other specialist  in process   MEDICATION LIST  yes   LABS     URINALYSIS (UA)  no     PRE-VISIT CHECKLIST      Joint diagnostic appointment coordinated correctly          (ensure right order & amount of time) Yes   RECORD COLLECTION COMPLETE If no, please explain pending

## 2024-11-16 ENCOUNTER — HEALTH MAINTENANCE LETTER (OUTPATIENT)
Age: 49
End: 2024-11-16

## 2024-12-05 ENCOUNTER — PRE VISIT (OUTPATIENT)
Dept: UROLOGY | Facility: CLINIC | Age: 49
End: 2024-12-05

## 2025-01-05 ENCOUNTER — HEALTH MAINTENANCE LETTER (OUTPATIENT)
Age: 50
End: 2025-01-05

## 2025-02-05 ENCOUNTER — ANCILLARY PROCEDURE (OUTPATIENT)
Dept: MAMMOGRAPHY | Facility: CLINIC | Age: 50
End: 2025-02-05
Attending: PHYSICIAN ASSISTANT
Payer: COMMERCIAL

## 2025-02-05 DIAGNOSIS — Z12.31 VISIT FOR SCREENING MAMMOGRAM: ICD-10-CM

## 2025-02-24 ENCOUNTER — ANCILLARY PROCEDURE (OUTPATIENT)
Dept: MAMMOGRAPHY | Facility: CLINIC | Age: 50
End: 2025-02-24
Attending: PHYSICIAN ASSISTANT
Payer: COMMERCIAL

## 2025-02-24 DIAGNOSIS — Z12.31 VISIT FOR SCREENING MAMMOGRAM: ICD-10-CM

## 2025-03-25 ENCOUNTER — ANCILLARY PROCEDURE (OUTPATIENT)
Dept: MAMMOGRAPHY | Facility: CLINIC | Age: 50
End: 2025-03-25
Attending: PHYSICIAN ASSISTANT
Payer: COMMERCIAL

## 2025-03-25 DIAGNOSIS — Z12.31 VISIT FOR SCREENING MAMMOGRAM: ICD-10-CM

## 2025-03-25 PROCEDURE — 77063 BREAST TOMOSYNTHESIS BI: CPT | Mod: TC | Performed by: RADIOLOGY

## 2025-03-25 PROCEDURE — 77067 SCR MAMMO BI INCL CAD: CPT | Mod: TC | Performed by: RADIOLOGY

## (undated) DEVICE — DRAPE SHEET 3/4 78X60"

## (undated) DEVICE — SOL WATER IRRIG 1000ML BOTTLE 07139-09

## (undated) DEVICE — BUR ARTHREX COOLCUT EXCALIBUR 4.0MMX13CM AR-8400EX

## (undated) DEVICE — GLOVE PROTEXIS BLUE W/NEU-THERA 7.5  2D73EB75

## (undated) DEVICE — DRAPE STERI U 1015

## (undated) DEVICE — Device

## (undated) DEVICE — PREP CHLORAPREP 26ML TINTED ORANGE  260815

## (undated) DEVICE — GLOVE PROTEXIS POWDER FREE 8.0 ORTHOPEDIC 2D73ET80

## (undated) DEVICE — GLOVE PROTEXIS W/NEU-THERA 7.5  2D73TE75

## (undated) DEVICE — SOL NACL 0.9% IRRIG 3000ML BAG 07972-08

## (undated) DEVICE — DRSG STERI STRIP 1/2X4" R1547

## (undated) DEVICE — GLOVE PROTEXIS BLUE W/NEU-THERA 8.0  2D73EB80

## (undated) DEVICE — BNDG ELASTIC 6"X5YDS UNSTERILE 6611-60

## (undated) DEVICE — SU PROLENE 3-0 PS-2 18" 8687H

## (undated) DEVICE — BNDG ESMARK 6" STERILE

## (undated) DEVICE — PACK ARTHROSCOPY KNEE SOP15AKFSM

## (undated) RX ORDER — PROPOFOL 10 MG/ML
INJECTION, EMULSION INTRAVENOUS
Status: DISPENSED
Start: 2021-06-03

## (undated) RX ORDER — FENTANYL CITRATE-0.9 % NACL/PF 10 MCG/ML
PLASTIC BAG, INJECTION (ML) INTRAVENOUS
Status: DISPENSED
Start: 2021-06-03

## (undated) RX ORDER — KETOROLAC TROMETHAMINE 30 MG/ML
INJECTION, SOLUTION INTRAMUSCULAR; INTRAVENOUS
Status: DISPENSED
Start: 2021-06-03

## (undated) RX ORDER — DEXAMETHASONE SODIUM PHOSPHATE 4 MG/ML
INJECTION, SOLUTION INTRA-ARTICULAR; INTRALESIONAL; INTRAMUSCULAR; INTRAVENOUS; SOFT TISSUE
Status: DISPENSED
Start: 2021-06-03

## (undated) RX ORDER — LIDOCAINE HYDROCHLORIDE 20 MG/ML
INJECTION, SOLUTION INFILTRATION; PERINEURAL
Status: DISPENSED
Start: 2021-06-03

## (undated) RX ORDER — OXYCODONE HYDROCHLORIDE 5 MG/1
TABLET ORAL
Status: DISPENSED
Start: 2021-06-03

## (undated) RX ORDER — FENTANYL CITRATE 50 UG/ML
INJECTION, SOLUTION INTRAMUSCULAR; INTRAVENOUS
Status: DISPENSED
Start: 2021-06-03

## (undated) RX ORDER — ACETAMINOPHEN 325 MG/1
TABLET ORAL
Status: DISPENSED
Start: 2021-06-03

## (undated) RX ORDER — ONDANSETRON 2 MG/ML
INJECTION INTRAMUSCULAR; INTRAVENOUS
Status: DISPENSED
Start: 2021-06-03

## (undated) RX ORDER — KETAMINE HYDROCHLORIDE 50 MG/ML
INJECTION, SOLUTION INTRAMUSCULAR; INTRAVENOUS
Status: DISPENSED
Start: 2021-06-03

## (undated) RX ORDER — SCOLOPAMINE TRANSDERMAL SYSTEM 1 MG/1
PATCH, EXTENDED RELEASE TRANSDERMAL
Status: DISPENSED
Start: 2021-06-03